# Patient Record
Sex: FEMALE | Race: WHITE | NOT HISPANIC OR LATINO | Employment: OTHER | URBAN - NONMETROPOLITAN AREA
[De-identification: names, ages, dates, MRNs, and addresses within clinical notes are randomized per-mention and may not be internally consistent; named-entity substitution may affect disease eponyms.]

---

## 2017-04-10 ENCOUNTER — OFFICE VISIT (OUTPATIENT)
Dept: OTOLARYNGOLOGY | Facility: CLINIC | Age: 57
End: 2017-04-10

## 2017-04-10 ENCOUNTER — PROCEDURE VISIT (OUTPATIENT)
Dept: OTOLARYNGOLOGY | Facility: CLINIC | Age: 57
End: 2017-04-10

## 2017-04-10 VITALS
DIASTOLIC BLOOD PRESSURE: 88 MMHG | SYSTOLIC BLOOD PRESSURE: 142 MMHG | WEIGHT: 178 LBS | BODY MASS INDEX: 34.95 KG/M2 | HEIGHT: 60 IN | HEART RATE: 75 BPM | TEMPERATURE: 97.8 F

## 2017-04-10 DIAGNOSIS — IMO0001 ASYMMETRICAL HEARING LOSS, LEFT: Primary | ICD-10-CM

## 2017-04-10 DIAGNOSIS — H90.6 MIXED HEARING LOSS, BILATERAL: Primary | ICD-10-CM

## 2017-04-10 PROCEDURE — HEARINGNOCHG: Performed by: AUDIOLOGIST

## 2017-04-10 PROCEDURE — 99213 OFFICE O/P EST LOW 20 MIN: CPT | Performed by: OTOLARYNGOLOGY

## 2017-04-10 NOTE — PROGRESS NOTES
Patient Care Team:  Flor Landa MD as PCP - General  Ajay Ascencio MD as Consulting Physician (Otolaryngology)    Chief complaint: follow-up myringotomy tubes    Subjective     Kaylie Oconnor is a 57 y.o. female who presents status post myringotomy tube insertion. The patient currently has had no related complaints. The patient denies pain, fever, change of hearing and otorrhea.    Review of Systems  HENT: as per HPI  CONSTITUTIONAL: No fever, chills  GI: no nausea or vomiting    History  Past Medical History:   Diagnosis Date   • Allergic rhinitis    • Cellulitis    • Chronic otitis media    • Eustachian tube dysfunction    • Hearing loss    • Hyperlipidemia    • Hypertension    • Tympanic membrane perforation      Past Surgical History:   Procedure Laterality Date   • CLEFT PALATE REPAIR     • MYRINGOTOMY W/ TUBES     • TYMPANOPLASTY       Family History   Problem Relation Age of Onset   • Heart disease Mother    • Hypertension Mother    • Diabetes Mother    • Heart disease Father    • Hypertension Father      Social History   Substance Use Topics   • Smoking status: Never Smoker   • Smokeless tobacco: None   • Alcohol use None       Current Outpatient Prescriptions:   •  atorvastatin (LIPITOR) 10 MG tablet, one (1) time a day, Disp: , Rfl:   •  estradiol (ESTRACE) 1 MG tablet, Take 1 mg by mouth., Disp: , Rfl:   •  meloxicam (MOBIC) 15 MG tablet, one (1) time a day, Disp: , Rfl:   •  metoprolol succinate XL (TOPROL-XL) 25 MG 24 hr tablet, Take 25 mg by mouth., Disp: , Rfl:   •  triamterene-hydrochlorothiazide (DYAZIDE) 37.5-25 MG per capsule, , Disp: , Rfl:   Allergies:  Review of patient's allergies indicates no known allergies.    Objective     Vital Signs  Temp:  [97.8 °F (36.6 °C)] 97.8 °F (36.6 °C)  Heart Rate:  [75] 75  BP: (142)/(88) 142/88    Physical Exam:  CONSTITUTIONAL: well nourished, well-developed, alert, oriented, in no acute distress   COMMUNICATION AND VOICE: able to communicate  normally for age, normal voice quality  HEAD: normocephalic, no lesions, atraumatic, no tenderness, no masses   FACE: appearance normal, no lesions, no tenderness, no deformities, facial motion symmetric  EYES: ocular motility normal, eyelids normal, orbits normal, no proptosis, conjunctiva normal , pupils equal, round   EARS:  Hearing: response to conversational voice normal bilaterally, left hearing aid use  External Ears: auricles without lesions  Otoscopic: ear canals normal, right myringotomy tube with cerumen at the base of the tube  NOSE:  External Nose: structure normal, no tenderness on palpation, no nasal discharge, no lesions, no evidence of trauma, nostrils patent   ORAL:  Lips: upper and lower lips without lesion   NECK: neck appearance normal  CHEST/RESPIRATORY: respiratory effort normal, normal chest excursion  CARDIOVASCULAR: extremities without cyanosis or edema   NEUROLOGIC/PSYCHIATRIC: oriented appropriately, mood normal, affect appropriate, CN II-XII intact grossly    Assessment   1. Mixed hearing loss, bilateral    s/p myringotomy tube insertion  eustachian tube dysfunction    Plan   Dry ear precautions.  Continue hearing aid use  I discussed the patients findings and my recommendations and answered questions.     Follow up in 6 months    Ajay Ascencio MD  04/10/17  10:28 AM

## 2017-10-23 ENCOUNTER — PROCEDURE VISIT (OUTPATIENT)
Dept: OTOLARYNGOLOGY | Facility: CLINIC | Age: 57
End: 2017-10-23

## 2017-10-23 ENCOUNTER — OFFICE VISIT (OUTPATIENT)
Dept: OTOLARYNGOLOGY | Facility: CLINIC | Age: 57
End: 2017-10-23

## 2017-10-23 VITALS
SYSTOLIC BLOOD PRESSURE: 118 MMHG | HEIGHT: 58 IN | WEIGHT: 175.5 LBS | BODY MASS INDEX: 36.84 KG/M2 | DIASTOLIC BLOOD PRESSURE: 76 MMHG | TEMPERATURE: 97.4 F

## 2017-10-23 DIAGNOSIS — E66.9 CLASS 2 OBESITY WITH BODY MASS INDEX (BMI) OF 36.0 TO 36.9 IN ADULT, UNSPECIFIED OBESITY TYPE, UNSPECIFIED WHETHER SERIOUS COMORBIDITY PRESENT: ICD-10-CM

## 2017-10-23 DIAGNOSIS — H90.6 MIXED HEARING LOSS, BILATERAL: Primary | ICD-10-CM

## 2017-10-23 DIAGNOSIS — H69.83 EUSTACHIAN TUBE DYSFUNCTION, BILATERAL: ICD-10-CM

## 2017-10-23 DIAGNOSIS — H69.83 DYSFUNCTION OF BOTH EUSTACHIAN TUBES: Primary | ICD-10-CM

## 2017-10-23 DIAGNOSIS — H90.6 MIXED HEARING LOSS, BILATERAL: ICD-10-CM

## 2017-10-23 PROCEDURE — 99213 OFFICE O/P EST LOW 20 MIN: CPT | Performed by: OTOLARYNGOLOGY

## 2017-10-23 PROCEDURE — HEARINGNOCHG: Performed by: AUDIOLOGIST

## 2017-10-23 RX ORDER — ESTRADIOL 1 MG/1
1 TABLET ORAL
COMMUNITY
Start: 2017-10-11 | End: 2018-10-12

## 2017-10-23 RX ORDER — DIPHENHYDRAMINE HCL 25 MG
25 CAPSULE ORAL NIGHTLY
COMMUNITY
End: 2022-09-19

## 2017-10-23 RX ORDER — CIPROFLOXACIN AND DEXAMETHASONE 3; 1 MG/ML; MG/ML
4 SUSPENSION/ DROPS AURICULAR (OTIC) 2 TIMES DAILY
Qty: 7.5 ML | Refills: 0 | Status: SHIPPED | OUTPATIENT
Start: 2017-10-23 | End: 2017-10-30

## 2017-10-23 NOTE — PROGRESS NOTES
Patient Intake Note    Review of Systems  Review of Systems   Constitutional: Negative for chills, fatigue and fever.   HENT:        See HPI   Respiratory: Negative for cough, choking, shortness of breath and wheezing.    Cardiovascular: Negative.    Gastrointestinal: Negative for constipation, diarrhea, nausea and vomiting.   Allergic/Immunologic: Negative for environmental allergies and food allergies.   Neurological: Negative for dizziness, light-headedness and headaches.   Hematological: Does not bruise/bleed easily.   Psychiatric/Behavioral: Negative for sleep disturbance.         Wilma Stauffer  10/23/2017  9:13 AM

## 2017-10-23 NOTE — PATIENT INSTRUCTIONS
Exercising to Lose Weight  Exercising can help you to lose weight. In order to lose weight through exercise, you need to do vigorous-intensity exercise. You can tell that you are exercising with vigorous intensity if you are breathing very hard and fast and cannot hold a conversation while exercising.  Moderate-intensity exercise helps to maintain your current weight. You can tell that you are exercising at a moderate level if you have a higher heart rate and faster breathing, but you are still able to hold a conversation.  HOW OFTEN SHOULD I EXERCISE?  Choose an activity that you enjoy and set realistic goals. Your health care provider can help you to make an activity plan that works for you. Exercise regularly as directed by your health care provider. This may include:  · Doing resistance training twice each week, such as:    Push-ups.    Sit-ups.    Lifting weights.    Using resistance bands.  · Doing a given intensity of exercise for a given amount of time. Choose from these options:    150 minutes of moderate-intensity exercise every week.    75 minutes of vigorous-intensity exercise every week.    A mix of moderate-intensity and vigorous-intensity exercise every week.  Children, pregnant women, people who are out of shape, people who are overweight, and older adults may need to consult a health care provider for individual recommendations. If you have any sort of medical condition, be sure to consult your health care provider before starting a new exercise program.  WHAT ARE SOME ACTIVITIES THAT CAN HELP ME TO LOSE WEIGHT?   · Walking at a rate of at least 4.5 miles an hour.  · Jogging or running at a rate of 5 miles per hour.  · Biking at a rate of at least 10 miles per hour.  · Lap swimming.  · Roller-skating or in-line skating.  · Cross-country skiing.  · Vigorous competitive sports, such as football, basketball, and soccer.  · Jumping rope.  · Aerobic dancing.  HOW CAN I BE MORE ACTIVE IN MY DAY-TO-DAY  ACTIVITIES?  · Use the stairs instead of the elevator.  · Take a walk during your lunch break.  · If you drive, park your car farther away from work or school.  · If you take public transportation, get off one stop early and walk the rest of the way.  · Make all of your phone calls while standing up and walking around.  · Get up, stretch, and walk around every 30 minutes throughout the day.  WHAT GUIDELINES SHOULD I FOLLOW WHILE EXERCISING?  · Do not exercise so much that you hurt yourself, feel dizzy, or get very short of breath.  · Consult your health care provider prior to starting a new exercise program.  · Wear comfortable clothes and shoes with good support.  · Drink plenty of water while you exercise to prevent dehydration or heat stroke. Body water is lost during exercise and must be replaced.  · Work out until you breathe faster and your heart beats faster.     This information is not intended to replace advice given to you by your health care provider. Make sure you discuss any questions you have with your health care provider.     Document Released: 01/20/2012 Document Revised: 01/08/2016 Document Reviewed: 05/21/2015  [employer] Interactive Patient Education ©2017 [employer]    Calorie Counting for Weight Loss  Calories are energy you get from the things you eat and drink. Your body uses this energy to keep you going throughout the day. The number of calories you eat affects your weight. When you eat more calories than your body needs, your body stores the extra calories as fat. When you eat fewer calories than your body needs, your body burns fat to get the energy it needs.  Calorie counting means keeping track of how many calories you eat and drink each day. If you make sure to eat fewer calories than your body needs, you should lose weight. In order for calorie counting to work, you will need to eat the number of calories that are right for you in a day to lose a healthy amount of weight per week. A  healthy amount of weight to lose per week is usually 1-2 lb (0.5-0.9 kg). A dietitian can determine how many calories you need in a day and give you suggestions on how to reach your calorie goal.   WHAT IS MY MY PLAN?  My goal is to have __________ calories per day.   If I have this many calories per day, I should lose around __________ pounds per week.  WHAT DO I NEED TO KNOW ABOUT CALORIE COUNTING?  In order to meet your daily calorie goal, you will need to:  · Find out how many calories are in each food you would like to eat. Try to do this before you eat.  · Decide how much of the food you can eat.  · Write down what you ate and how many calories it had. Doing this is called keeping a food log.  WHERE DO I FIND CALORIE INFORMATION?  The number of calories in a food can be found on a Nutrition Facts label. Note that all the information on a label is based on a specific serving of the food. If a food does not have a Nutrition Facts label, try to look up the calories online or ask your dietitian for help.  HOW DO I DECIDE HOW MUCH TO EAT?  To decide how much of the food you can eat, you will need to consider both the number of calories in one serving and the size of one serving. This information can be found on the Nutrition Facts label. If a food does not have a Nutrition Facts label, look up the information online or ask your dietitian for help.  Remember that calories are listed per serving. If you choose to have more than one serving of a food, you will have to multiply the calories per serving by the amount of servings you plan to eat. For example, the label on a package of bread might say that a serving size is 1 slice and that there are 90 calories in a serving. If you eat 1 slice, you will have eaten 90 calories. If you eat 2 slices, you will have eaten 180 calories.  HOW DO I KEEP A FOOD LOG?  After each meal, record the following information in your food log:  · What you ate.  · How much of it you  ate.  · How many calories it had.  · Then, add up your calories.  Keep your food log near you, such as in a small notebook in your pocket. Another option is to use a mobile jonah or website. Some programs will calculate calories for you and show you how many calories you have left each time you add an item to the log.  WHAT ARE SOME CALORIE COUNTING TIPS?  · Use your calories on foods and drinks that will fill you up and not leave you hungry. Some examples of this include foods like nuts and nut butters, vegetables, lean proteins, and high-fiber foods (more than 5 g fiber per serving).  · Eat nutritious foods and avoid empty calories. Empty calories are calories you get from foods or beverages that do not have many nutrients, such as candy and soda. It is better to have a nutritious high-calorie food (such as an avocado) than a food with few nutrients (such as a bag of chips).  · Know how many calories are in the foods you eat most often. This way, you do not have to look up how many calories they have each time you eat them.  · Look out for foods that may seem like low-calorie foods but are really high-calorie foods, such as baked goods, soda, and fat-free candy.  · Pay attention to calories in drinks. Drinks such as sodas, specialty coffee drinks, alcohol, and juices have a lot of calories yet do not fill you up. Choose low-calorie drinks like water and diet drinks.  · Focus your calorie counting efforts on higher calorie items. Logging the calories in a garden salad that contains only vegetables is less important than calculating the calories in a milk shake.  · Find a way of tracking calories that works for you. Get creative. Most people who are successful find ways to keep track of how much they eat in a day, even if they do not count every calorie.  WHAT ARE SOME PORTION CONTROL TIPS?  · Know how many calories are in a serving. This will help you know how many servings of a certain food you can have.  · Use a  measuring cup to measure serving sizes. This is helpful when you start out. With time, you will be able to estimate serving sizes for some foods.  · Take some time to put servings of different foods on your favorite plates, bowls, and cups so you know what a serving looks like.  · Try not to eat straight from a bag or box. Doing this can lead to overeating. Put the amount you would like to eat in a cup or on a plate to make sure you are eating the right portion.  · Use smaller plates, glasses, and bowls to prevent overeating. This is a quick and easy way to practice portion control. If your plate is smaller, less food can fit on it.  · Try not to multitask while eating, such as watching TV or using your computer. If it is time to eat, sit down at a table and enjoy your food. Doing this will help you to start recognizing when you are full. It will also make you more aware of what and how much you are eating.  HOW CAN I CALORIE COUNT WHEN EATING OUT?  · Ask for smaller portion sizes or child-sized portions.  · Consider sharing an entree and sides instead of getting your own entree.  · If you get your own entree, eat only half. Ask for a box at the beginning of your meal and put the rest of your entree in it so you are not tempted to eat it.  · Look for the calories on the menu. If calories are listed, choose the lower calorie options.  · Choose dishes that include vegetables, fruits, whole grains, low-fat dairy products, and lean protein. Focusing on smart food choices from each of the 5 food groups can help you stay on track at restaurants.  · Choose items that are boiled, broiled, grilled, or steamed.  · Choose water, milk, unsweetened iced tea, or other drinks without added sugars. If you want an alcoholic beverage, choose a lower calorie option. For example, a regular alexandr can have up to 700 calories and a glass of wine has around 150.  · Stay away from items that are buttered, battered, fried, or served with  "cream sauce. Items labeled \"crispy\" are usually fried, unless stated otherwise.  · Ask for dressings, sauces, and syrups on the side. These are usually very high in calories, so do not eat much of them.  · Watch out for salads. Many people think salads are a healthy option, but this is often not the case. Many salads come with wright, fried chicken, lots of cheese, fried chips, and dressing. All of these items have a lot of calories. If you want a salad, choose a garden salad and ask for grilled meats or steak. Ask for the dressing on the side, or ask for olive oil and vinegar or lemon to use as dressing.  · Estimate how many servings of a food you are given. For example, a serving of cooked rice is ½ cup or about the size of half a tennis ball or one cupcake wrapper. Knowing serving sizes will help you be aware of how much food you are eating at restaurants. The list below tells you how big or small some common portion sizes are based on everyday objects.    1 oz--4 stacked dice.    3 oz--1 deck of cards.    1 tsp--1 dice.    1 Tbsp--½ a Ping-Pong ball.    2 Tbsp--1 Ping-Pong ball.    ½ cup--1 tennis ball or 1 cupcake wrapper.    1 cup--1 baseball.     This information is not intended to replace advice given to you by your health care provider. Make sure you discuss any questions you have with your health care provider.     Document Released: 12/18/2006 Document Revised: 01/08/2016 Document Reviewed: 10/23/2014  [employer] Interactive Patient Education ©2017 [employer]    "

## 2017-10-23 NOTE — PROGRESS NOTES
Patient Care Team:  Flor Landa MD as PCP - General  Ajay Ascencio MD as Consulting Physician (Otolaryngology)    Chief complaint: follow-up myringotomy tubes    Subjective   HPI  Kaylie Oconnor is a 57 y.o. female who presents status post myringotomy tube insertion. The patient currently has had no related complaints. The patient denies pain, fever,and otorrhea.  Parents have noticed that she is not hearing as well lately.  She wears a left-sided hearing aid that is over 10 years old.    Review of Systems  HENT: as per HPI  CONSTITUTIONAL: No fever, chills  GI: no nausea or vomiting    History  Past Medical History:   Diagnosis Date   • Allergic rhinitis    • Cellulitis    • Chronic otitis media    • Eustachian tube dysfunction    • Hearing loss    • Hyperlipidemia    • Hypertension    • Tympanic membrane perforation      Past Surgical History:   Procedure Laterality Date   • CLEFT PALATE REPAIR     • MYRINGOTOMY W/ TUBES     • TYMPANOPLASTY       Family History   Problem Relation Age of Onset   • Heart disease Mother    • Hypertension Mother    • Diabetes Mother    • Heart disease Father    • Hypertension Father      Social History   Substance Use Topics   • Smoking status: Never Smoker   • Smokeless tobacco: Never Used   • Alcohol use No       Current Outpatient Prescriptions:   •  atorvastatin (LIPITOR) 10 MG tablet, one (1) time a day, Disp: , Rfl:   •  diphenhydrAMINE (BENADRYL) 25 mg capsule, Take 25 mg by mouth Every Night., Disp: , Rfl:   •  estradiol (ESTRACE) 1 MG tablet, Take 1 mg by mouth., Disp: , Rfl:   •  meloxicam (MOBIC) 15 MG tablet, one (1) time a day, Disp: , Rfl:   •  metoprolol succinate XL (TOPROL-XL) 25 MG 24 hr tablet, Take 25 mg by mouth., Disp: , Rfl:   •  triamterene-hydrochlorothiazide (DYAZIDE) 37.5-25 MG per capsule, , Disp: , Rfl:   •  ciprofloxacin-dexamethasone (CIPRODEX) 0.3-0.1 % otic suspension, Administer 4 drops into ears 2 (Two) Times a Day for 7 days., Disp: 7.5  mL, Rfl: 0  Allergies:  Review of patient's allergies indicates no known allergies.    Objective   Vital Signs  Temp:  [97.4 °F (36.3 °C)] 97.4 °F (36.3 °C)  BP: (118)/(76) 118/76    HPI  CONSTITUTIONAL: well nourished, well-developed, alert, oriented, in no acute distress   COMMUNICATION AND VOICE: able to communicate normally for age, normal voice quality  HEAD: normocephalic, no lesions, atraumatic, no tenderness, no masses   FACE: appearance normal, no lesions, no tenderness, no deformities, facial motion symmetric  EYES: ocular motility normal, eyelids normal, orbits normal, no proptosis, conjunctiva normal , pupils equal, round   EARS:  Hearing: response to conversational voice normal bilaterally   External Ears: auricles without lesions  Otoscopic:   RIGHT EXTERNAL EAR CANAL: normal ear canal structure, no stenosis present, mild cerumen present,  LEFT EXTERNAL EAR CANAL: normal structure, no stenosis, no lesions, no drainage present,  RIGHT TYMPANIC MEMBRANE: right myringotomy tube in place, dry, obstructed with cerumen,  LEFT TYMPANIC MEMBRANE: no lesions present, no perforation present cartilaginous grafting present  NOSE:  External Nose: structure normal, no tenderness on palpation, no nasal discharge, no lesions, no evidence of trauma, nostrils patent   ORAL:  Lips: upper and lower lips without lesion   NECK: neck appearance normal  CHEST/RESPIRATORY: respiratory effort normal, normal chest excursion  CARDIOVASCULAR: extremities without cyanosis or edema   NEUROLOGIC/PSYCHIATRIC: oriented appropriately, mood normal, affect appropriate, CN II-XII intact grossly    Assessment   1. Mixed hearing loss, bilateral    2. Eustachian tube dysfunction, bilateral    3. Class 2 obesity with body mass index (BMI) of 36.0 to 36.9 in adult, unspecified obesity type, unspecified whether serious comorbidity present          Plan    I am wondering if she has obstructed her right tube.  We will place her on Ciprodex to try  to liquefy the crusting.  Otherwise, she will need to discuss updating her hearing aids with audiology.  Dry ear precautions.  Call for problems, especially ear pain or pressure, ear drainage, fever, or decreased hearing.  I discussed the patients findings and my recommendations and answered questions.     Return in about 2 months (around 12/23/2017).    Ajay Ascencio MD  10/23/17  9:33 AM

## 2017-10-23 NOTE — PROGRESS NOTES
Ms. Oconnor presented to the clinic this date for a hearing aid recheck. She was accompanied to this visit by her mother who reported Kaylie will often not respond when spoken to. She was unsure if this was an issue with the hearing aid or lack of attention.    Aid was cleaned and wax guard was changed. A sensogram was performed and indicated essentially stable hearing.     Otoscopy revealed an in place PE tube with cerumen in the right ear and a clear EAC left. Tympanometry suggested a blocked right tube. Kaylie was seen by Dr. Ascencio prior to aid check and was prescribed ear drops. We will do a formal audiogram on next visit once right tube is clear.

## 2018-01-08 ENCOUNTER — OFFICE VISIT (OUTPATIENT)
Dept: OTOLARYNGOLOGY | Facility: CLINIC | Age: 58
End: 2018-01-08

## 2018-01-08 VITALS
TEMPERATURE: 97.6 F | HEIGHT: 58 IN | WEIGHT: 165.5 LBS | BODY MASS INDEX: 34.74 KG/M2 | DIASTOLIC BLOOD PRESSURE: 78 MMHG | SYSTOLIC BLOOD PRESSURE: 112 MMHG

## 2018-01-08 DIAGNOSIS — H69.83 EUSTACHIAN TUBE DYSFUNCTION, BILATERAL: Primary | ICD-10-CM

## 2018-01-08 DIAGNOSIS — H90.6 MIXED HEARING LOSS, BILATERAL: ICD-10-CM

## 2018-01-08 PROCEDURE — 99213 OFFICE O/P EST LOW 20 MIN: CPT | Performed by: OTOLARYNGOLOGY

## 2018-01-08 NOTE — PATIENT INSTRUCTIONS
MyPlate from [employer]  The general, healthful diet is based on the 2010 Dietary Guidelines for Americans. The amount of food you need to eat from each food group depends on your age, sex, and level of physical activity and can be individualized by a dietitian. Go to ChooseMyPlate.gov for more information.  WHAT DO I NEED TO KNOW ABOUT THE MYPLATE PLAN?  · Enjoy your food, but eat less.    · Avoid oversized portions.      ½ of your plate should include fruits and vegetables.    ¼ of your plate should be grains.    ¼ of your plate should be protein.  Grains  · Make at least half of your grains whole grains.  · For a 2,000 calorie daily food plan, eat 6 oz every day.  · 1 oz is about 1 slice bread, 1 cup cereal, or ½ cup cooked rice, cereal, or pasta.  Vegetables  · Make half your plate fruits and vegetables.  · For a 2,000 calorie daily food plan, eat 2½ cups every day.  · 1 cup is about 1 cup raw or cooked vegetables or vegetable juice or 2 cups raw leafy greens.  Fruits  · Make half your plate fruits and vegetables.  · For a 2,000 calorie daily food plan, eat 2 cups every day.  · 1 cup is about 1 cup fruit or 100% fruit juice or ½ cup dried fruit.  Protein  · For a 2,000 calorie daily food plan, eat 5½ oz every day.  · 1 oz is about 1 oz meat, poultry, or fish, ¼ cup cooked beans, 1 egg, 1 Tbsp peanut butter, or ½ oz nuts or seeds.  Dairy  · Switch to fat-free or low-fat (1%) milk.  · For a 2,000 calorie daily food plan, eat 3 cups every day.  · 1 cup is about 1 cup milk or yogurt or soy milk (soy beverage), 1½ oz natural cheese, or 2 oz processed cheese.  Fats, Oils, and Empty Calories  · Only small amounts of oils are recommended.  · Empty calories are calories from solid fats or added sugars.  · Compare sodium in foods like soup, bread, and frozen meals. Choose the foods with lower numbers.  · Drink water instead of sugary drinks.  WHAT FOODS CAN I EAT?  Grains  Whole grains such as whole wheat, quinoa, millet, and  bulgur. Bread, rolls, and pasta made from whole grains. Brown or wild rice. Hot or cold cereals made from whole grains and without added sugar.  Vegetables  All fresh vegetables, especially fresh red, dark green, or orange vegetables. Peas and beans. Low-sodium frozen or canned vegetables prepared without added salt. Low-sodium vegetable juices.  Fruits  All fresh, frozen, and dried fruits. Canned fruit packed in water or fruit juice without added sugar. Fruit juices without added sugar.  Meats and Other Protein Sources  Boiled, baked, or grilled lean meat trimmed of fat. Skinless poultry. Fresh seafood and shellfish. Canned seafood packed in water. Unsalted nuts and unsalted nut butters. Tofu. Dried beans and pea. Eggs.  Dairy  Low-fat or fat-free milk, yogurt, and cheeses.   Sweets and Desserts  Frozen desserts made from low-fat milk.  Fats and Oils  Olive, peanut, and canola oils and margarine. Salad dressing and mayonnaise made from these oils.  Other  Soups and casseroles made from allowed ingredients and without added fat or salt.  The items listed above may not be a complete list of recommended foods or beverages. Contact your dietitian for more options.   WHAT FOODS ARE NOT RECOMMENDED?  Grains  Sweetened, low-fiber cereals. Packaged baked goods. Snack crackers and chips. Cheese crackers, butter crackers, and biscuits. Frozen waffles, sweet breads, doughnuts, pastries, packaged baking mixes, pancakes, cakes, and cookies.  Vegetables  Regular canned or frozen vegetables or vegetables prepared with salt. Canned tomatoes. Canned tomato sauce. Fried vegetables. Vegetables in cream sauce or cheese sauce.  Fruits  Fruits packed in syrup or made with added sugar.   Meats and Other Protein Sources  Marbled or fatty meats such as ribs. Poultry with skin. Fried meats, poultry, eggs, or fish. Sausages, hot dogs, and deli meats such as pastrami, bologna, or salami.  Dairy  Whole milk, cream, cheeses made from whole  milk, sour cream. Ice cream or yogurt made from whole milk or with added sugar.  Beverages  For adults, no more than one alcoholic drink per day. Regular soft drinks or other sugary beverages. Juice drinks.  Sweets and Desserts  Sugary or fatty desserts, candy, and other sweets.  Fats and Oils  Solid shortening or partially hydrogenated oils. Solid margarine. Margarine that contains trans fats. Butter.  The items listed above may not be a complete list of foods and beverages to avoid. Contact your dietitian for more information.     This information is not intended to replace advice given to you by your health care provider. Make sure you discuss any questions you have with your health care provider.     Document Released: 01/06/2009 Document Revised: 01/08/2016 Document Reviewed: 11/26/2014  [employer] Interactive Patient Education ©2017 [employer]   Calorie Counting for Weight Loss  Calories are energy you get from the things you eat and drink. Your body uses this energy to keep you going throughout the day. The number of calories you eat affects your weight. When you eat more calories than your body needs, your body stores the extra calories as fat. When you eat fewer calories than your body needs, your body burns fat to get the energy it needs.  Calorie counting means keeping track of how many calories you eat and drink each day. If you make sure to eat fewer calories than your body needs, you should lose weight. In order for calorie counting to work, you will need to eat the number of calories that are right for you in a day to lose a healthy amount of weight per week. A healthy amount of weight to lose per week is usually 1-2 lb (0.5-0.9 kg). A dietitian can determine how many calories you need in a day and give you suggestions on how to reach your calorie goal.   WHAT IS MY MY PLAN?  My goal is to have __________ calories per day.   If I have this many calories per day, I should lose around __________ pounds  per week.  WHAT DO I NEED TO KNOW ABOUT CALORIE COUNTING?  In order to meet your daily calorie goal, you will need to:  · Find out how many calories are in each food you would like to eat. Try to do this before you eat.  · Decide how much of the food you can eat.  · Write down what you ate and how many calories it had. Doing this is called keeping a food log.  WHERE DO I FIND CALORIE INFORMATION?  The number of calories in a food can be found on a Nutrition Facts label. Note that all the information on a label is based on a specific serving of the food. If a food does not have a Nutrition Facts label, try to look up the calories online or ask your dietitian for help.  HOW DO I DECIDE HOW MUCH TO EAT?  To decide how much of the food you can eat, you will need to consider both the number of calories in one serving and the size of one serving. This information can be found on the Nutrition Facts label. If a food does not have a Nutrition Facts label, look up the information online or ask your dietitian for help.  Remember that calories are listed per serving. If you choose to have more than one serving of a food, you will have to multiply the calories per serving by the amount of servings you plan to eat. For example, the label on a package of bread might say that a serving size is 1 slice and that there are 90 calories in a serving. If you eat 1 slice, you will have eaten 90 calories. If you eat 2 slices, you will have eaten 180 calories.  HOW DO I KEEP A FOOD LOG?  After each meal, record the following information in your food log:  · What you ate.  · How much of it you ate.  · How many calories it had.  · Then, add up your calories.  Keep your food log near you, such as in a small notebook in your pocket. Another option is to use a mobile jonah or website. Some programs will calculate calories for you and show you how many calories you have left each time you add an item to the log.  WHAT ARE SOME CALORIE COUNTING  TIPS?  · Use your calories on foods and drinks that will fill you up and not leave you hungry. Some examples of this include foods like nuts and nut butters, vegetables, lean proteins, and high-fiber foods (more than 5 g fiber per serving).  · Eat nutritious foods and avoid empty calories. Empty calories are calories you get from foods or beverages that do not have many nutrients, such as candy and soda. It is better to have a nutritious high-calorie food (such as an avocado) than a food with few nutrients (such as a bag of chips).  · Know how many calories are in the foods you eat most often. This way, you do not have to look up how many calories they have each time you eat them.  · Look out for foods that may seem like low-calorie foods but are really high-calorie foods, such as baked goods, soda, and fat-free candy.  · Pay attention to calories in drinks. Drinks such as sodas, specialty coffee drinks, alcohol, and juices have a lot of calories yet do not fill you up. Choose low-calorie drinks like water and diet drinks.  · Focus your calorie counting efforts on higher calorie items. Logging the calories in a garden salad that contains only vegetables is less important than calculating the calories in a milk shake.  · Find a way of tracking calories that works for you. Get creative. Most people who are successful find ways to keep track of how much they eat in a day, even if they do not count every calorie.  WHAT ARE SOME PORTION CONTROL TIPS?  · Know how many calories are in a serving. This will help you know how many servings of a certain food you can have.  · Use a measuring cup to measure serving sizes. This is helpful when you start out. With time, you will be able to estimate serving sizes for some foods.  · Take some time to put servings of different foods on your favorite plates, bowls, and cups so you know what a serving looks like.  · Try not to eat straight from a bag or box. Doing this can lead to  "overeating. Put the amount you would like to eat in a cup or on a plate to make sure you are eating the right portion.  · Use smaller plates, glasses, and bowls to prevent overeating. This is a quick and easy way to practice portion control. If your plate is smaller, less food can fit on it.  · Try not to multitask while eating, such as watching TV or using your computer. If it is time to eat, sit down at a table and enjoy your food. Doing this will help you to start recognizing when you are full. It will also make you more aware of what and how much you are eating.  HOW CAN I CALORIE COUNT WHEN EATING OUT?  · Ask for smaller portion sizes or child-sized portions.  · Consider sharing an entree and sides instead of getting your own entree.  · If you get your own entree, eat only half. Ask for a box at the beginning of your meal and put the rest of your entree in it so you are not tempted to eat it.  · Look for the calories on the menu. If calories are listed, choose the lower calorie options.  · Choose dishes that include vegetables, fruits, whole grains, low-fat dairy products, and lean protein. Focusing on smart food choices from each of the 5 food groups can help you stay on track at restaurants.  · Choose items that are boiled, broiled, grilled, or steamed.  · Choose water, milk, unsweetened iced tea, or other drinks without added sugars. If you want an alcoholic beverage, choose a lower calorie option. For example, a regular alexandr can have up to 700 calories and a glass of wine has around 150.  · Stay away from items that are buttered, battered, fried, or served with cream sauce. Items labeled \"crispy\" are usually fried, unless stated otherwise.  · Ask for dressings, sauces, and syrups on the side. These are usually very high in calories, so do not eat much of them.  · Watch out for salads. Many people think salads are a healthy option, but this is often not the case. Many salads come with wright, fried " chicken, lots of cheese, fried chips, and dressing. All of these items have a lot of calories. If you want a salad, choose a garden salad and ask for grilled meats or steak. Ask for the dressing on the side, or ask for olive oil and vinegar or lemon to use as dressing.  · Estimate how many servings of a food you are given. For example, a serving of cooked rice is ½ cup or about the size of half a tennis ball or one cupcake wrapper. Knowing serving sizes will help you be aware of how much food you are eating at restaurants. The list below tells you how big or small some common portion sizes are based on everyday objects.    1 oz--4 stacked dice.    3 oz--1 deck of cards.    1 tsp--1 dice.    1 Tbsp--½ a Ping-Pong ball.    2 Tbsp--1 Ping-Pong ball.    ½ cup--1 tennis ball or 1 cupcake wrapper.    1 cup--1 baseball.     This information is not intended to replace advice given to you by your health care provider. Make sure you discuss any questions you have with your health care provider.     Document Released: 12/18/2006 Document Revised: 01/08/2016 Document Reviewed: 10/23/2014  [employer] Interactive Patient Education ©2017 [employer]     Exercising to Lose Weight  Exercising can help you to lose weight. In order to lose weight through exercise, you need to do vigorous-intensity exercise. You can tell that you are exercising with vigorous intensity if you are breathing very hard and fast and cannot hold a conversation while exercising.  Moderate-intensity exercise helps to maintain your current weight. You can tell that you are exercising at a moderate level if you have a higher heart rate and faster breathing, but you are still able to hold a conversation.  HOW OFTEN SHOULD I EXERCISE?  Choose an activity that you enjoy and set realistic goals. Your health care provider can help you to make an activity plan that works for you. Exercise regularly as directed by your health care provider. This may include:  · Doing  resistance training twice each week, such as:    Push-ups.    Sit-ups.    Lifting weights.    Using resistance bands.  · Doing a given intensity of exercise for a given amount of time. Choose from these options:    150 minutes of moderate-intensity exercise every week.    75 minutes of vigorous-intensity exercise every week.    A mix of moderate-intensity and vigorous-intensity exercise every week.  Children, pregnant women, people who are out of shape, people who are overweight, and older adults may need to consult a health care provider for individual recommendations. If you have any sort of medical condition, be sure to consult your health care provider before starting a new exercise program.  WHAT ARE SOME ACTIVITIES THAT CAN HELP ME TO LOSE WEIGHT?   · Walking at a rate of at least 4.5 miles an hour.  · Jogging or running at a rate of 5 miles per hour.  · Biking at a rate of at least 10 miles per hour.  · Lap swimming.  · Roller-skating or in-line skating.  · Cross-country skiing.  · Vigorous competitive sports, such as football, basketball, and soccer.  · Jumping rope.  · Aerobic dancing.  HOW CAN I BE MORE ACTIVE IN MY DAY-TO-DAY ACTIVITIES?  · Use the stairs instead of the elevator.  · Take a walk during your lunch break.  · If you drive, park your car farther away from work or school.  · If you take public transportation, get off one stop early and walk the rest of the way.  · Make all of your phone calls while standing up and walking around.  · Get up, stretch, and walk around every 30 minutes throughout the day.  WHAT GUIDELINES SHOULD I FOLLOW WHILE EXERCISING?  · Do not exercise so much that you hurt yourself, feel dizzy, or get very short of breath.  · Consult your health care provider prior to starting a new exercise program.  · Wear comfortable clothes and shoes with good support.  · Drink plenty of water while you exercise to prevent dehydration or heat stroke. Body water is lost during exercise and  must be replaced.  · Work out until you breathe faster and your heart beats faster.     This information is not intended to replace advice given to you by your health care provider. Make sure you discuss any questions you have with your health care provider.     Document Released: 01/20/2012 Document Revised: 01/08/2016 Document Reviewed: 05/21/2015  [employer] Interactive Patient Education ©2017 [employer]

## 2018-01-08 NOTE — PROGRESS NOTES
Patient Care Team:  Flor Landa MD as PCP - General  Ajay Ascencio MD as Consulting Physician (Otolaryngology)    Chief complaint: follow-up myringotomy tubes    Subjective   HPI  Kaylie Oconnor is a 57 y.o. female who presents status post myringotomy tube insertion. The patient currently has had no related complaints. The patient denies pain, fever, change of hearing and otorrhea.    Review of Systems  HENT: as per HPI  CONSTITUTIONAL: No fever, chills  GI: no nausea or vomiting    History  Past Medical History:   Diagnosis Date   • Allergic rhinitis    • Cellulitis    • Chronic otitis media    • Eustachian tube dysfunction    • Hearing loss    • Hyperlipidemia    • Hypertension    • Tympanic membrane perforation      Past Surgical History:   Procedure Laterality Date   • CLEFT PALATE REPAIR     • MYRINGOTOMY W/ TUBES     • TYMPANOPLASTY       Family History   Problem Relation Age of Onset   • Heart disease Mother    • Hypertension Mother    • Diabetes Mother    • Heart disease Father    • Hypertension Father      Social History   Substance Use Topics   • Smoking status: Never Smoker   • Smokeless tobacco: Never Used   • Alcohol use No       Current Outpatient Prescriptions:   •  atorvastatin (LIPITOR) 10 MG tablet, one (1) time a day, Disp: , Rfl:   •  diphenhydrAMINE (BENADRYL) 25 mg capsule, Take 25 mg by mouth Every Night., Disp: , Rfl:   •  estradiol (ESTRACE) 1 MG tablet, Take 1 mg by mouth., Disp: , Rfl:   •  meloxicam (MOBIC) 15 MG tablet, one (1) time a day, Disp: , Rfl:   •  metoprolol succinate XL (TOPROL-XL) 25 MG 24 hr tablet, Take 25 mg by mouth., Disp: , Rfl:   •  triamterene-hydrochlorothiazide (DYAZIDE) 37.5-25 MG per capsule, , Disp: , Rfl:   Allergies:  Review of patient's allergies indicates no known allergies.    Objective   Vital Signs  Temp:  [97.6 °F (36.4 °C)] 97.6 °F (36.4 °C)  BP: (112)/(78) 112/78    HPI  CONSTITUTIONAL: well nourished, well-developed, alert, oriented, in no  acute distress   COMMUNICATION AND VOICE: able to communicate normally for age, normal voice quality  HEAD: normocephalic, no lesions, atraumatic, no tenderness, no masses   FACE: appearance normal, no lesions, no tenderness, no deformities, facial motion symmetric  EYES: ocular motility normal, eyelids normal, orbits normal, no proptosis, conjunctiva normal , pupils equal, round   EARS:  Hearing: response to conversational voice normal bilaterally   External Ears: auricles without lesions  Otoscopic:   EXTERNAL EAR CANALS: normal ear canals without stenosis or significant cerumen  RIGHT TYMPANIC MEMBRANE: right myringotomy tube in place, obstructed with cerumen,  LEFT TYMPANIC MEMBRANE: Left tympanic membrane with postoperative and cartilaginous change  NOSE:  External Nose: structure normal, no tenderness on palpation, no nasal discharge, no lesions, no evidence of trauma, nostrils patent   ORAL:  Lips: upper and lower lips without lesion   NECK: neck appearance normal  CHEST/RESPIRATORY: respiratory effort normal, normal chest excursion  CARDIOVASCULAR: extremities without cyanosis or edema   NEUROLOGIC/PSYCHIATRIC: oriented appropriately, mood normal, affect appropriate, CN II-XII intact grossly    Assessment   1. Eustachian tube dysfunction, bilateral    2. Mixed hearing loss, bilateral          Plan   Dry ear precautions.  Call for problems, especially ear pain or pressure, ear drainage, fever, or decreased hearing.  I discussed the patients findings and my recommendations and answered questions.   We will be conservative with a plugged tube at this time.  If she develops ear pain, drainage, or hearing loss, we may need to consider removal of the obstructed tube with replacement of a fresh T-tube.    Return in about 4 months (around 5/8/2018).    Ajay Ascencio MD  01/08/18  3:05 PM

## 2018-01-08 NOTE — PROGRESS NOTES
Patient Intake Note    Review of Systems  Review of Systems   Constitutional: Negative for chills, fatigue and fever.   HENT:        See HPI   Respiratory: Negative for cough, choking, shortness of breath and wheezing.    Cardiovascular: Negative.    Gastrointestinal: Negative for constipation, diarrhea, nausea and vomiting.   Allergic/Immunologic: Negative for environmental allergies and food allergies.   Neurological: Negative for dizziness, light-headedness and headaches.   Hematological: Does not bruise/bleed easily.   Psychiatric/Behavioral: Negative for sleep disturbance.         Gretchen Cheema RN  1/8/2018  2:39 PM

## 2018-04-26 ENCOUNTER — OFFICE VISIT (OUTPATIENT)
Dept: OTOLARYNGOLOGY | Facility: CLINIC | Age: 58
End: 2018-04-26

## 2018-04-26 VITALS
WEIGHT: 165.5 LBS | TEMPERATURE: 97.2 F | SYSTOLIC BLOOD PRESSURE: 128 MMHG | DIASTOLIC BLOOD PRESSURE: 82 MMHG | BODY MASS INDEX: 34.74 KG/M2 | HEIGHT: 58 IN

## 2018-04-26 DIAGNOSIS — H69.83 EUSTACHIAN TUBE DYSFUNCTION, BILATERAL: Primary | ICD-10-CM

## 2018-04-26 DIAGNOSIS — H90.6 MIXED HEARING LOSS, BILATERAL: ICD-10-CM

## 2018-04-26 PROCEDURE — 99213 OFFICE O/P EST LOW 20 MIN: CPT | Performed by: OTOLARYNGOLOGY

## 2018-04-26 NOTE — PROGRESS NOTES
Ajay Ascencio MD     Chief Complaint   Patient presents with   • Follow-up     recheck tube       HPI   Kaylie Oconnor is a  58 y.o. female who is here for follow up. She has had no current complaints. The patient has not had complaints of: recent flair ups of symptoms.    Review of Systems:  Reviewed per patient intake note    Past History:  Past medical and surgical history, family history and social history reviewed and updated when appropriate.  Current medications and allergies reviewed and updated when appropriate.  Allergies:  Review of patient's allergies indicates no known allergies.    Vital Signs:   Temp:  [97.2 °F (36.2 °C)] 97.2 °F (36.2 °C)  BP: (128)/(82) 128/82    Physical Exam   CONSTITUTIONAL: well nourished, well-developed, alert, oriented, in no acute distress   COMMUNICATION AND VOICE: able to communicate normally, normal voice quality  HEAD: normocephalic, no lesions, atraumatic, no tenderness, no masses   FACE: appearance normal, no lesions, no tenderness, no deformities, facial motion symmetric  EYES: ocular motility normal, eyelids normal, orbits normal, no proptosis, conjunctiva normal , pupils equal, round  HEARING: normal with hearing aid use  EXTERNAL EARS: auricles without lesions  EXTERNAL EAR CANALS: normal ear canals without stenosis or significant cerumen  RIGHT TYMPANIC MEMBRANE: right myringotomy tube: in place, with cerumen at the base of the tube,  LEFT TYMPANIC MEMBRANE: moderate  post surgical change present,, cartilaginous grafting present,  EXTERNAL NOSE: structure normal, no tenderness on palpation, no nasal discharge, no lesions, no evidence of trauma, nostrils patent  LIPS: structure normal, no tenderness on palpation, no lesions, no evidence of trauma  NECK: neck appearance normal  LYMPH NODES: no lymphadenopathy  CHEST/RESPIRATORY: respiratory effort normal  CARDIOVASCULAR: extremities without cyanosis or edema, no overt jugulovenous distension  "present  NEUROLOGIC/PSYCHIATRIC: oriented appropriately for age, mood normal, affect appropriate, cranial nerves intact grossly unless specifically mentioned above     {RESULTS REVIEW (Optional):21963::\" \":1}    Assessment   1. Eustachian tube dysfunction, bilateral    2. Mixed hearing loss, bilateral        Plan    Continue current management plan.  Call for ear problems, especially otalgia, otalgia, otorrhea and change in hearing.    Return in about 6 months (around 10/26/2018).    Ajay Ascencio MD  04/26/18  11:02 AM    "

## 2018-04-26 NOTE — PROGRESS NOTES
Wilma Stauffer   Patient Intake Note    Review of Systems  Review of Systems   Constitutional: Negative for chills, fatigue and fever.   HENT:        See HPI   Respiratory: Negative for cough, choking, shortness of breath and wheezing.    Cardiovascular: Negative.    Gastrointestinal: Negative for constipation, diarrhea, nausea and vomiting.   Allergic/Immunologic: Negative for environmental allergies and food allergies.   Neurological: Negative for dizziness, light-headedness and headaches.   Hematological: Does not bruise/bleed easily.   Psychiatric/Behavioral: Negative for sleep disturbance.       QUALITY MEASURES    Body Mass Index Screening and Follow-Up Plan  Body mass index is 34.59 kg/m².  Patient's Body mass index is 34.59 kg/m². BMI is above normal parameters. Follow-up plan includes:  exercise counseling.    Tobacco Use: Screening and Cessation Intervention  Smoking status: Never Smoker                                                              Smokeless tobacco: Never Used                            Wilma Stauffer  4/26/2018  10:35 AM

## 2018-10-25 ENCOUNTER — OFFICE VISIT (OUTPATIENT)
Dept: OTOLARYNGOLOGY | Facility: CLINIC | Age: 58
End: 2018-10-25

## 2018-10-25 VITALS
HEIGHT: 58 IN | BODY MASS INDEX: 34.22 KG/M2 | DIASTOLIC BLOOD PRESSURE: 84 MMHG | SYSTOLIC BLOOD PRESSURE: 134 MMHG | TEMPERATURE: 97.9 F | WEIGHT: 163 LBS

## 2018-10-25 DIAGNOSIS — H90.6 MIXED HEARING LOSS, BILATERAL: ICD-10-CM

## 2018-10-25 DIAGNOSIS — T85.898A VENTILATION TUBE BLOCKED, INITIAL ENCOUNTER: ICD-10-CM

## 2018-10-25 DIAGNOSIS — H69.83 EUSTACHIAN TUBE DYSFUNCTION, BILATERAL: Primary | ICD-10-CM

## 2018-10-25 PROCEDURE — 99213 OFFICE O/P EST LOW 20 MIN: CPT | Performed by: OTOLARYNGOLOGY

## 2018-10-25 RX ORDER — ESTRADIOL 1 MG/1
1 TABLET ORAL DAILY
COMMUNITY
Start: 2018-10-19 | End: 2019-10-28 | Stop reason: ALTCHOICE

## 2018-10-25 NOTE — PATIENT INSTRUCTIONS
MyPlate from [employer]  The general, healthful diet is based on the 2010 Dietary Guidelines for Americans. The amount of food you need to eat from each food group depends on your age, sex, and level of physical activity and can be individualized by a dietitian. Go to ChooseMyPlate.gov for more information.  What do I need to know about the MyPlate plan?  · Enjoy your food, but eat less.  · Avoid oversized portions.  ? ½ of your plate should include fruits and vegetables.  ? ¼ of your plate should be grains.  ? ¼ of your plate should be protein.  Grains  · Make at least half of your grains whole grains.  · For a 2,000 calorie daily food plan, eat 6 oz every day.  · 1 oz is about 1 slice bread, 1 cup cereal, or ½ cup cooked rice, cereal, or pasta.  Vegetables  · Make half your plate fruits and vegetables.  · For a 2,000 calorie daily food plan, eat 2½ cups every day.  · 1 cup is about 1 cup raw or cooked vegetables or vegetable juice or 2 cups raw leafy greens.  Fruits  · Make half your plate fruits and vegetables.  · For a 2,000 calorie daily food plan, eat 2 cups every day.  · 1 cup is about 1 cup fruit or 100% fruit juice or ½ cup dried fruit.  Protein  · For a 2,000 calorie daily food plan, eat 5½ oz every day.  · 1 oz is about 1 oz meat, poultry, or fish, ¼ cup cooked beans, 1 egg, 1 Tbsp peanut butter, or ½ oz nuts or seeds.  Dairy  · Switch to fat-free or low-fat (1%) milk.  · For a 2,000 calorie daily food plan, eat 3 cups every day.  · 1 cup is about 1 cup milk or yogurt or soy milk (soy beverage), 1½ oz natural cheese, or 2 oz processed cheese.  Fats, Oils, and Empty Calories  · Only small amounts of oils are recommended.  · Empty calories are calories from solid fats or added sugars.  · Compare sodium in foods like soup, bread, and frozen meals. Choose the foods with lower numbers.  · Drink water instead of sugary drinks.  What foods can I eat?  Grains  Whole grains such as whole wheat, quinoa, millet, and  bulgur. Bread, rolls, and pasta made from whole grains. Brown or wild rice. Hot or cold cereals made from whole grains and without added sugar.  Vegetables  All fresh vegetables, especially fresh red, dark green, or orange vegetables. Peas and beans. Low-sodium frozen or canned vegetables prepared without added salt. Low-sodium vegetable juices.  Fruits  All fresh, frozen, and dried fruits. Canned fruit packed in water or fruit juice without added sugar. Fruit juices without added sugar.  Meats and Other Protein Sources  Boiled, baked, or grilled lean meat trimmed of fat. Skinless poultry. Fresh seafood and shellfish. Canned seafood packed in water. Unsalted nuts and unsalted nut butters. Tofu. Dried beans and pea. Eggs.  Dairy  Low-fat or fat-free milk, yogurt, and cheeses.  Sweets and Desserts  Frozen desserts made from low-fat milk.  Fats and Oils  Olive, peanut, and canola oils and margarine. Salad dressing and mayonnaise made from these oils.  Other  Soups and casseroles made from allowed ingredients and without added fat or salt.  The items listed above may not be a complete list of recommended foods or beverages. Contact your dietitian for more options.  What foods are not recommended?  Grains  Sweetened, low-fiber cereals. Packaged baked goods. Snack crackers and chips. Cheese crackers, butter crackers, and biscuits. Frozen waffles, sweet breads, doughnuts, pastries, packaged baking mixes, pancakes, cakes, and cookies.  Vegetables  Regular canned or frozen vegetables or vegetables prepared with salt. Canned tomatoes. Canned tomato sauce. Fried vegetables. Vegetables in cream sauce or cheese sauce.  Fruits  Fruits packed in syrup or made with added sugar.  Meats and Other Protein Sources  Marbled or fatty meats such as ribs. Poultry with skin. Fried meats, poultry, eggs, or fish. Sausages, hot dogs, and deli meats such as pastrami, bologna, or salami.  Dairy  Whole milk, cream, cheeses made from whole milk,  sour cream. Ice cream or yogurt made from whole milk or with added sugar.  Beverages  For adults, no more than one alcoholic drink per day. Regular soft drinks or other sugary beverages. Juice drinks.  Sweets and Desserts  Sugary or fatty desserts, candy, and other sweets.  Fats and Oils  Solid shortening or partially hydrogenated oils. Solid margarine. Margarine that contains trans fats. Butter.  The items listed above may not be a complete list of foods and beverages to avoid. Contact your dietitian for more information.  This information is not intended to replace advice given to you by your health care provider. Make sure you discuss any questions you have with your health care provider.  Document Released: 01/06/2009 Document Revised: 05/25/2017 Document Reviewed: 11/26/2014  Elsevier Interactive Patient Education © 2018 [employer]

## 2018-10-25 NOTE — PROGRESS NOTES
Tejal Solitario MA   Patient Intake Note    Review of Systems  Review of Systems   HENT:        See hpi     Respiratory: Negative for cough, choking, shortness of breath and wheezing.    Neurological: Negative for dizziness, light-headedness and headaches.   Hematological: Does not bruise/bleed easily.   Psychiatric/Behavioral: Negative for sleep disturbance.   All other systems reviewed and are negative.      QUALITY MEASURES    Body Mass Index Screening and Follow-Up Plan  Body mass index is 34.07 kg/m².  Patient's Body mass index is 34.07 kg/m². BMI is above normal parameters. Recommendations include: referral to primary care.    Tobacco Use: Screening and Cessation Intervention  Smoking status: Never Smoker                                                              Smokeless tobacco: Never Used                            Tejal Solitario MA  10/25/2018  10:43 AM

## 2018-10-25 NOTE — PROGRESS NOTES
Ajay Ascencio MD     Chief Complaint   Patient presents with   • Follow-up       HPI   Kaylie Oconnor is a  58 y.o. female who is here for follow up. she currently does not have new complaints.  She denies ear pain, ear drainage or infections.  She does have some continued difficulty with decreased hearing.  Her hearing aid that she use on the left ear is older and probably needs to be evaluated.    Review of Systems:  Reviewed per patient intake note    Past History:  Past medical and surgical history, family history and social history reviewed and updated when appropriate.  Current medications and allergies reviewed and updated when appropriate.  Allergies:  Patient has no known allergies.    Vital Signs:   Temp:  [97.9 °F (36.6 °C)] 97.9 °F (36.6 °C)  BP: (134)/(84) 134/84    Physical Exam   CONSTITUTIONAL: well nourished, well-developed, alert, oriented, in no acute distress   COMMUNICATION AND VOICE: able to communicate normally, normal voice quality  HEAD: normocephalic, no lesions, atraumatic, no tenderness, no masses   FACE: appearance normal, no lesions, no tenderness, no deformities, facial motion symmetric  EYES: ocular motility normal, eyelids normal, orbits normal, no proptosis, conjunctiva normal , pupils equal, round  HEARING: response to conversational voice normal bilaterally   EXTERNAL EARS: auricles without lesions  EXTERNAL EAR CANAL: Normal structure bilaterally with mild right-sided cerumen accumulation  TYMPANIC MEMBRANE: On the left, she has stable myringosclerosis of the tympanic membrane with a mild superior retraction without collection of debris.  On the right, there is scarring of the tympanic membrane with a posterior T tube that is surrounded by ceruminous accumulation.  The lumen of the tube appears to be obstructed.  EXTERNAL NOSE: structure normal, no tenderness on palpation, no nasal discharge, no lesions, no evidence of trauma, nostrils patent  LIPS: structure normal, no  tenderness on palpation, no lesions, no evidence of trauma  NECK: neck appearance normal  LYMPH NODES: no lymphadenopathy  CHEST/RESPIRATORY: respiratory effort normal  CARDIOVASCULAR: extremities without cyanosis or edema, no overt jugulovenous distension present  NEUROLOGIC/PSYCHIATRIC: oriented appropriately for age, mood normal, affect appropriate, cranial nerves intact grossly unless specifically mentioned above         Assessment   1. Eustachian tube dysfunction, bilateral    2. Mixed hearing loss, bilateral    3. Ventilation tube blocked, initial encounter        Plan    Since she is currently not having any symptoms related to eustachian tube with a plugged tube, we have elected to observe this.  If she starts having pain or increasing hearing difficulty on the right side, she may need to have revision of this and would require going to the operating room for tube removal, and possible myringotomy tube insertion.  Far as her hearing, I recommended hearing aid evaluation and a new hearing aid as indicated.  They were going to discuss this with a hearing dispensary.    Return in about 6 months (around 4/25/2019).    Ajay Ascencio MD  10/25/18  12:01 PM

## 2019-04-21 NOTE — PROGRESS NOTES
Ajay Ascencio MD     Chief Complaint   Patient presents with   • Ear Problem     6 month follow up        History of Present Illness  Kaylie Oconnor is a  59 y.o. female who is here for follow up. She has had no current complaints. The patient has not had complaints of: ear pain, ear drainage or change in hearing.    Review of Systems  Reviewed per patient intake note    Past History:  Past medical and surgical history, family history and social history reviewed and updated when appropriate.  Current medications and allergies reviewed and updated when appropriate.  Allergies:  Patient has no known allergies.        Vital Signs:   Temp:  [97.8 °F (36.6 °C)] 97.8 °F (36.6 °C)  Heart Rate:  [84] 84  BP: (113)/(69) 113/69    Physical Exam:  CONSTITUTIONAL: well nourished, well-developed, alert, oriented, in no acute distress   COMMUNICATION AND VOICE: able to communicate normally, normal voice quality  HEAD: normocephalic, no lesions, atraumatic, no tenderness, no masses   FACE: appearance normal, no lesions, no tenderness, no deformities, facial motion symmetric  EYES: ocular motility normal, eyelids normal, orbits normal, no proptosis, conjunctiva normal , pupils equal, round  HEARING: response to conversational voice normal bilaterally   EXTERNAL EARS: auricles without lesions  EXTERNAL EAR CANALS: normal ear canals without stenosis or significant cerumen  TYMPANIC MEMBRANE: On the left, she has stable myringosclerosis of the tympanic membrane with a mild superior retraction without collection of debris. On the right, there is scarring of the tympanic membrane with a posterior T tube that is surrounded by ceruminous accumulation. The lumen of the tube appears to be obstructed, stable from the previous examination.  EXTERNAL NOSE: structure normal, no tenderness on palpation, no nasal discharge, no lesions, no evidence of trauma, nostrils patent  LIPS: structure normal, no tenderness on palpation, no lesions, no  evidence of trauma  NECK: neck appearance normal  LYMPH NODES: no lymphadenopathy  CHEST/RESPIRATORY: respiratory effort normal  CARDIOVASCULAR: extremities without cyanosis or edema, no overt jugulovenous distension present  NEUROLOGIC/PSYCHIATRIC: oriented appropriately for age, mood normal, affect appropriate, cranial nerves intact grossly unless specifically mentioned above          Assessment   1. Mixed hearing loss, bilateral    2. Eustachian tube dysfunction, bilateral        Plan    Conservative management.  At some point in time, if she begins to have symptoms on the right-hand side, we may need to consider going to the operating room for tube removal and replacement.  As long as she is without symptoms, I will continue as is.    Return in about 6 months (around 10/22/2019).    Ajay Ascencio MD  04/22/19  9:53 AM

## 2019-04-22 ENCOUNTER — OFFICE VISIT (OUTPATIENT)
Dept: OTOLARYNGOLOGY | Facility: CLINIC | Age: 59
End: 2019-04-22

## 2019-04-22 VITALS
DIASTOLIC BLOOD PRESSURE: 69 MMHG | BODY MASS INDEX: 35.68 KG/M2 | WEIGHT: 170 LBS | HEIGHT: 58 IN | OXYGEN SATURATION: 96 % | SYSTOLIC BLOOD PRESSURE: 113 MMHG | TEMPERATURE: 97.8 F | HEART RATE: 84 BPM

## 2019-04-22 DIAGNOSIS — H69.83 EUSTACHIAN TUBE DYSFUNCTION, BILATERAL: ICD-10-CM

## 2019-04-22 DIAGNOSIS — H90.6 MIXED HEARING LOSS, BILATERAL: Primary | ICD-10-CM

## 2019-04-22 PROCEDURE — 99213 OFFICE O/P EST LOW 20 MIN: CPT | Performed by: OTOLARYNGOLOGY

## 2019-04-22 RX ORDER — PANTOPRAZOLE SODIUM 40 MG/1
40 TABLET, DELAYED RELEASE ORAL DAILY
COMMUNITY
End: 2022-09-19

## 2019-04-22 RX ORDER — HYDROXYZINE HYDROCHLORIDE 10 MG/1
10 TABLET, FILM COATED ORAL 4 TIMES DAILY
COMMUNITY
End: 2019-10-28 | Stop reason: ALTCHOICE

## 2019-04-22 NOTE — PROGRESS NOTES
Marisol William MA   Patient Intake Note    Review of Systems  Review of Systems   Constitutional: Negative for fatigue and fever.   HENT:        See HPI   Eyes: Negative for pain.   Respiratory: Negative for cough and shortness of breath.    Cardiovascular: Negative.  Negative for chest pain and palpitations.   Gastrointestinal: Negative for constipation, diarrhea, nausea and vomiting.   Endocrine: Negative for cold intolerance and heat intolerance.   Genitourinary: Negative for dysuria, flank pain, frequency, hematuria and urgency.   Musculoskeletal: Positive for gait problem. Negative for arthralgias and back pain.   Skin: Negative for pallor, rash and wound.   Allergic/Immunologic: Negative for immunocompromised state.   Neurological: Negative for dizziness, tremors, seizures, weakness, numbness and headaches.   Hematological: Does not bruise/bleed easily.   Psychiatric/Behavioral: Negative for suicidal ideas.   All other systems reviewed and are negative.      QUALITY MEASURES    Body Mass Index Screening and Follow-Up Plan  Body mass index is 35.53 kg/m².  Patient's Body mass index is 35.53 kg/m². BMI is above normal parameters. Recommendations include: referral to primary care.    Tobacco Use: Screening and Cessation Intervention  Social History    Tobacco Use      Smoking status: Never Smoker      Smokeless tobacco: Never Used        Marisol William MA  4/22/2019  9:12 AM

## 2019-10-27 NOTE — PROGRESS NOTES
Ajay Ascencio MD     Chief Complaint   Patient presents with   • Hearing Loss     6 month follow-up.        History of Present Illness  Kaylie Oconnor is a  59 y.o. female who is here for follow up.  She has a history of chronic ear disease.  She has had no current complaints. The patient has not had complaints of: ear pain, ear drainage or change in hearing.      Review of Systems  Reviewed per patient intake note    Past History:  Past medical and surgical history, family history and social history reviewed and updated when appropriate.  Current medications and allergies reviewed and updated when appropriate.  Allergies:  Patient has no known allergies.        Vital Signs:   Temp:  [98.1 °F (36.7 °C)] 98.1 °F (36.7 °C)  Heart Rate:  [78] 78  Resp:  [17] 17  BP: (118)/(88) 118/88    Physical Exam:  CONSTITUTIONAL: well nourished, well-developed, alert, oriented, in no acute distress   COMMUNICATION AND VOICE: able to communicate normally, normal voice quality  HEAD: normocephalic, no lesions, atraumatic, no tenderness, no masses   FACE: appearance normal, no lesions, no tenderness, no deformities, facial motion symmetric  EYES: ocular motility normal, eyelids normal, orbits normal, no proptosis, conjunctiva normal , pupils equal, round  HEARING: response to conversational voice normal bilaterally   EXTERNAL EARS: auricles without lesions  EXTERNAL EAR CANALS: normal ear canals without stenosis or significant cerumen  TYMPANIC MEMBRANE: The left tympanic membrane has postoperative changes and is stable with no evidence of cholesteatoma.  On the right, there is an intact T-tube which is obstructed by cerumen with cerumen base of the tube.  There is no drainage no evidence of granulation tissue  EXTERNAL NOSE: structure normal, no tenderness on palpation, no nasal discharge, no lesions, no evidence of trauma, nostrils patent  LIPS: structure normal, no tenderness on palpation, no lesions, no evidence of  trauma  NECK: neck appearance normal  LYMPH NODES: no lymphadenopathy  CHEST/RESPIRATORY: respiratory effort normal  CARDIOVASCULAR: extremities without cyanosis or edema, no overt jugulovenous distension present  NEUROLOGIC/PSYCHIATRIC: oriented appropriately for age, mood normal, affect appropriate, cranial nerves intact grossly unless specifically mentioned above          Assessment   1. Mixed hearing loss, bilateral    2. Eustachian tube dysfunction, bilateral        Plan    Discussed possibilities of changing out the right-sided tube in the operating room.  However, she has no complaints at this time and we can watch it unless symptoms develop.  Patient Instructions   Call for ear problems, especially change of hearing, ear pain or dizziness.           Return in about 6 months (around 4/28/2020).    Ajay Ascencio MD  10/28/19  9:22 AM

## 2019-10-28 ENCOUNTER — OFFICE VISIT (OUTPATIENT)
Dept: OTOLARYNGOLOGY | Facility: CLINIC | Age: 59
End: 2019-10-28

## 2019-10-28 VITALS
TEMPERATURE: 98.1 F | RESPIRATION RATE: 17 BRPM | BODY MASS INDEX: 36.94 KG/M2 | SYSTOLIC BLOOD PRESSURE: 118 MMHG | DIASTOLIC BLOOD PRESSURE: 88 MMHG | HEART RATE: 78 BPM | OXYGEN SATURATION: 98 % | HEIGHT: 58 IN | WEIGHT: 176 LBS

## 2019-10-28 DIAGNOSIS — H69.83 EUSTACHIAN TUBE DYSFUNCTION, BILATERAL: ICD-10-CM

## 2019-10-28 DIAGNOSIS — H90.6 MIXED HEARING LOSS, BILATERAL: Primary | ICD-10-CM

## 2019-10-28 PROCEDURE — 99213 OFFICE O/P EST LOW 20 MIN: CPT | Performed by: OTOLARYNGOLOGY

## 2019-10-28 RX ORDER — METOPROLOL SUCCINATE 25 MG/1
1 TABLET, EXTENDED RELEASE ORAL DAILY
COMMUNITY

## 2019-10-28 NOTE — PROGRESS NOTES
Venus Nathan MA   Patient Intake Note    Review of Systems  Review of Systems   Constitutional: Negative for chills, fatigue and fever.   HENT:        See HPI   Eyes: Negative for pain, discharge, redness and itching.   Respiratory: Negative for cough, choking and wheezing.    Gastrointestinal: Negative for diarrhea, nausea and vomiting.   Musculoskeletal: Negative for neck pain and neck stiffness.   Allergic/Immunologic: Negative for environmental allergies and food allergies.   Neurological: Negative for dizziness, weakness, light-headedness and headaches.   Psychiatric/Behavioral: Negative for sleep disturbance.   All other systems reviewed and are negative.      QUALITY MEASURES    Tobacco Use: Screening and Cessation Intervention  Social History    Tobacco Use      Smoking status: Never Smoker      Smokeless tobacco: Never Used        Venus Nathan MA  10/28/2019  9:08 AM

## 2020-06-02 NOTE — PROGRESS NOTES
Ajay Ascencio MD     Chief Complaint   Patient presents with   • Ear Tube Follow-up        HPI  Kaylie Oconnor is a  60 y.o. female who is here for follow up.  She has had a history of myringotomy tube insertion in the past.  She is had a history of chronic ear disease and tympanomastoidectomy in the past.  She wears hearing aids on the left side.  She has had difficulty with decreasing hearing mostly that she has noticed on the right side.  She has not had drainage.  She does not have pain or irritation.  The right tube in the past was a T-tube and is been in place for several years.      Review of Systems   Constitutional: Negative for chills and fever.   HENT: Positive for hearing loss. Negative for ear discharge and ear pain.    Respiratory: Negative for cough, choking and shortness of breath.    Gastrointestinal: Negative for diarrhea, nausea and vomiting.   Musculoskeletal: Negative for neck pain and neck stiffness.   Neurological: Negative for dizziness, light-headedness and headaches.   Hematological: Bruises/bleeds easily.   Psychiatric/Behavioral: Negative for sleep disturbance.   All other systems reviewed and are negative.     I have reviewed the ROS as documented by the MA/LPN/RN Ajay Ascencio MD     Past History:   Past medical and surgical history, family history and social history reviewed and updated when appropriate.  Current medications and allergies reviewed and updated when appropriate.  Allergies:  Patient has no known allergies.        Vital Signs:   Temp:  [97.8 °F (36.6 °C)] 97.8 °F (36.6 °C)  Heart Rate:  [85] 85  BP: (128)/(83) 128/83    Physical Exam  CONSTITUTIONAL: well nourished, well-developed, alert, oriented, in no acute distress   COMMUNICATION AND VOICE: able to communicate normally, normal voice quality  HEAD: normocephalic, no lesions, atraumatic, no tenderness, no masses   FACE: appearance normal, no lesions, no tenderness, no deformities, facial motion  symmetric  EYES: ocular motility normal, eyelids normal, orbits normal, no proptosis, conjunctiva normal , pupils equal, round  HEARING: normal with hearing aid use  EXTERNAL EARS: auricles without lesions  EXTERNAL EAR CANAL: She has cerumen that is mostly accumulated around the shaft of the T-tube.  It is nonobstructing.  This is on the right side.  The left ear canal is clear.  TYMPANIC MEMBRANE: There is a T-tube in the right tympanic membrane.  This is with wax surrounding the shaft of the tube but the lumen of the tube is open.  I see no granulation tissue or drainage.  There is a lot of sclerotic change but no evidence of perforation or fluid.  On the left, the eardrum is status post surgery with sclerosis and possible cartilaginous tympanoplasty change.  There is no granulation tissue, perforation, drainage, or fluid.  EXTERNAL NOSE: structure normal, no tenderness on palpation, no nasal discharge, no lesions, no evidence of trauma, nostrils patent  LIPS: structure normal, no tenderness on palpation, no lesions, no evidence of trauma  NECK: neck appearance normal  LYMPH NODES: no lymphadenopathy  CHEST/RESPIRATORY: respiratory effort normal  CARDIOVASCULAR: extremities without cyanosis or edema, no overt jugulovenous distension present  NEUROLOGIC/PSYCHIATRIC: oriented appropriately for age, mood normal, affect appropriate, cranial nerves intact grossly unless specifically mentioned above     RESULTS REVIEW:    I have reviewed the patients old records in the chart.        Assessment/Plan    Assessment:   1. Dysfunction of Eustachian tube, bilateral    2. S/P myringotomy with insertion of tube    3. Mixed conductive and sensorineural hearing loss of both ears        Plan:      I feel that her hearing changes most likely her known mixed hearing loss due to the surgeries and chronic ear disease combined with natural aging changes to her inner ear.  I feel the best way to address this is to have a hearing aid  evaluation to see if it might be time for her to wear hearing aid on the right side.  If she continues to have problems or if the evaluation shows signs that we need to address the longstanding tube in her ear, I can always take her back to the operating room for an examination, cleaning of the tube, and changed out to a fresh tube.  However, with the symptoms that she is describing, I feel that it is more likely that she would gain better benefit from hearing aid evaluation and use.             Return in 1 year (on 6/3/2021).        Ajay Ascencio MD  06/03/20  12:08

## 2020-06-03 ENCOUNTER — OFFICE VISIT (OUTPATIENT)
Dept: OTOLARYNGOLOGY | Facility: CLINIC | Age: 60
End: 2020-06-03

## 2020-06-03 VITALS
HEIGHT: 58 IN | DIASTOLIC BLOOD PRESSURE: 83 MMHG | BODY MASS INDEX: 37.36 KG/M2 | SYSTOLIC BLOOD PRESSURE: 128 MMHG | WEIGHT: 178 LBS | HEART RATE: 85 BPM | TEMPERATURE: 97.8 F

## 2020-06-03 DIAGNOSIS — Z96.22 S/P MYRINGOTOMY WITH INSERTION OF TUBE: ICD-10-CM

## 2020-06-03 DIAGNOSIS — H69.83 DYSFUNCTION OF EUSTACHIAN TUBE, BILATERAL: ICD-10-CM

## 2020-06-03 DIAGNOSIS — H90.6 MIXED CONDUCTIVE AND SENSORINEURAL HEARING LOSS OF BOTH EARS: ICD-10-CM

## 2020-06-03 PROCEDURE — 99213 OFFICE O/P EST LOW 20 MIN: CPT | Performed by: OTOLARYNGOLOGY

## 2020-06-03 RX ORDER — FLUTICASONE PROPIONATE 50 MCG
2 SPRAY, SUSPENSION (ML) NASAL AS NEEDED
COMMUNITY

## 2020-06-03 NOTE — PATIENT INSTRUCTIONS
I feel that her hearing changes most likely her known mixed hearing loss due to the surgeries and chronic ear disease combined with natural aging changes to her inner ear.  I feel the best way to address this is to have a hearing aid evaluation to see if it might be time for her to wear hearing aid on the right side.  If she continues to have problems or if the evaluation shows signs that we need to address the longstanding tube in her ear, I can always take her back to the operating room for an examination, cleaning of the tube, and changed out to a fresh tube.  However, with the symptoms that she is describing, I feel that it is more likely that she would gain better benefit from hearing aid evaluation and use.

## 2021-06-03 ENCOUNTER — OFFICE VISIT (OUTPATIENT)
Dept: OTOLARYNGOLOGY | Facility: CLINIC | Age: 61
End: 2021-06-03

## 2021-06-03 VITALS
HEART RATE: 81 BPM | TEMPERATURE: 97.5 F | WEIGHT: 170 LBS | SYSTOLIC BLOOD PRESSURE: 124 MMHG | DIASTOLIC BLOOD PRESSURE: 75 MMHG | BODY MASS INDEX: 35.53 KG/M2

## 2021-06-03 DIAGNOSIS — H90.6 MIXED CONDUCTIVE AND SENSORINEURAL HEARING LOSS OF BOTH EARS: ICD-10-CM

## 2021-06-03 DIAGNOSIS — Z96.22 S/P MYRINGOTOMY WITH INSERTION OF TUBE: ICD-10-CM

## 2021-06-03 DIAGNOSIS — H69.83 EUSTACHIAN TUBE DYSFUNCTION, BILATERAL: Primary | ICD-10-CM

## 2021-06-03 PROCEDURE — 99214 OFFICE O/P EST MOD 30 MIN: CPT | Performed by: EMERGENCY MEDICINE

## 2021-06-03 RX ORDER — CIPROFLOXACIN AND DEXAMETHASONE 3; 1 MG/ML; MG/ML
3 SUSPENSION/ DROPS AURICULAR (OTIC) 4 TIMES DAILY
Qty: 7.5 ML | Refills: 1 | Status: SHIPPED | OUTPATIENT
Start: 2021-06-03 | End: 2022-09-19

## 2021-06-03 NOTE — PATIENT INSTRUCTIONS
CONTACT INFORMATION:  The main office phone number is [phone]. For emergencies after hours and on weekends, this number will convert over to our answering service and the on call provider will answer. Please try to keep non emergent phone calls/ questions to office hours 9am-5pm Monday through Friday.     Splurgy  As an alternative, you can sign up and use the Epic MyChart system for more direct and quicker access for non emergent questions/ problems.  [facility] Splurgy allows you to send messages to your doctor, view your test results, renew your prescriptions, schedule appointments, and more. To sign up, go to [employer] and click on the Sign Up Now link in the New User? box. Enter your Splurgy Activation Code exactly as it appears below along with the last four digits of your Social Security Number and your Date of Birth () to complete the sign-up process. If you do not sign up before the expiration date, you must request a new code.    Splurgy Activation Code: Activation code not generated  Current Splurgy Status: Account disabled    If you have questions, you can email [email or web address] or call [phone] to talk to our Splurgy staff. Remember, Splurgy is NOT to be used for urgent needs. For medical emergencies, dial 911.

## 2022-03-10 ENCOUNTER — OFFICE VISIT (OUTPATIENT)
Dept: OTOLARYNGOLOGY | Facility: CLINIC | Age: 62
End: 2022-03-10

## 2022-03-10 VITALS
WEIGHT: 159.4 LBS | TEMPERATURE: 97.8 F | DIASTOLIC BLOOD PRESSURE: 77 MMHG | HEIGHT: 61 IN | HEART RATE: 92 BPM | SYSTOLIC BLOOD PRESSURE: 122 MMHG | BODY MASS INDEX: 30.09 KG/M2

## 2022-03-10 DIAGNOSIS — H90.6 MIXED CONDUCTIVE AND SENSORINEURAL HEARING LOSS OF BOTH EARS: ICD-10-CM

## 2022-03-10 DIAGNOSIS — H69.83 EUSTACHIAN TUBE DYSFUNCTION, BILATERAL: Primary | ICD-10-CM

## 2022-03-10 DIAGNOSIS — Z96.22 S/P MYRINGOTOMY WITH INSERTION OF TUBE: ICD-10-CM

## 2022-03-10 PROCEDURE — 99213 OFFICE O/P EST LOW 20 MIN: CPT | Performed by: OTOLARYNGOLOGY

## 2022-03-10 RX ORDER — OXYBUTYNIN CHLORIDE 5 MG/1
5 TABLET ORAL 2 TIMES DAILY
COMMUNITY

## 2022-03-10 RX ORDER — PHENOL 1.4 %
600 AEROSOL, SPRAY (ML) MUCOUS MEMBRANE DAILY
COMMUNITY

## 2022-03-10 RX ORDER — ATORVASTATIN CALCIUM 10 MG/1
10 TABLET, FILM COATED ORAL DAILY
COMMUNITY
Start: 2022-02-07

## 2022-03-10 RX ORDER — DOCUSATE SODIUM 100 MG/1
100 CAPSULE, LIQUID FILLED ORAL 2 TIMES DAILY
COMMUNITY

## 2022-03-10 NOTE — PROGRESS NOTES
Ajay Ascencio MD  [facility] [place] [facility] EAR NOSE & THROAT  [address], [address]  [facility] [address]  Fax [phone]  Phone [phone]      Visit Type: FOLLOW UP   Chief Complaint   Patient presents with   • Follow-up   • Ear Problem        ARTURO Oconnor is a  62 y.o. female who is here for follow up. She has had no current complaints. The patient has not had complaints of: ear pain, ear drainage or change in hearing.     Past Medical History:   Diagnosis Date   • Allergic rhinitis    • Cellulitis    • Chronic otitis media    • Eustachian tube dysfunction    • GERD (gastroesophageal reflux disease)    • Hearing loss    • Hyperlipidemia    • Hypertension    • Tympanic membrane perforation        Past Surgical History:   Procedure Laterality Date   • CLEFT PALATE REPAIR     • HIP SURGERY Left    • MYRINGOTOMY W/ TUBES     • TYMPANOPLASTY         Family History: Her family history includes Diabetes in her mother; Heart disease in her father and mother; Hypertension in her father and mother.     Social History: She  reports that she has never smoked. She has never used smokeless tobacco. She reports that she does not drink alcohol and does not use drugs.    Home Medications:  atorvastatin, calcium carbonate, ciprofloxacin-dexamethasone, diphenhydrAMINE, docusate sodium, fluticasone, metoprolol succinate XL, oxybutynin, pantoprazole, and triamterene-hydrochlorothiazide    Allergies:  She has No Known Allergies.       Vital Signs:   Temp:  [97.8 °F (36.6 °C)] 97.8 °F (36.6 °C)  Heart Rate:  [92] 92  BP: (122)/(77) 122/77  Physical Exam  CONSTITUTIONAL: well nourished, well-developed, alert, oriented, in no acute distress,    COMMUNICATION AND VOICE: able to communicate normally for age, mild hyponasal voice quality  HEAD: normocephalic, no lesions, atraumatic, no tenderness, no masses   FACE: appearance normal, no lesions, no tenderness, no deformities,  facial motion symmetric  EYES: ocular motility normal, eyelids normal, orbits normal, no proptosis, conjunctiva normal , pupils equal, round   EARS:  HEARING: response to conversational voice normal bilaterally   EXTERNAL EAR: auricles without lesions  EXTERNAL AUDITORY CANAL: ear canals normal, no obstruction  RIGHT TYMPANIC MEMBRANE: myringotomy tube findings: T tube visible with cerumen surrounding and partially obstructing.  No drainage.  LEFT TYMPANIC MEMBRANE: sclerotic without perforation  EXTERNAL NOSE: structure normal, no tenderness on palpation, no nasal discharge, no lesions, no evidence of trauma, nostrils patent       Result Review    RESULTS REVIEW    I have reviewed the patients old records in the chart.     Assessment/Plan    Diagnoses and all orders for this visit:    1. Eustachian tube dysfunction, bilateral (Primary)    2. S/P myringotomy with insertion of tube    3. Mixed conductive and sensorineural hearing loss of both ears       Continue current management plan.           Return in about 6 months (around 9/10/2022).      Ajay Ascencio MD  03/10/22  16:55 CST

## 2022-09-19 ENCOUNTER — OFFICE VISIT (OUTPATIENT)
Dept: OTOLARYNGOLOGY | Facility: CLINIC | Age: 62
End: 2022-09-19

## 2022-09-19 VITALS
SYSTOLIC BLOOD PRESSURE: 130 MMHG | TEMPERATURE: 97.6 F | BODY MASS INDEX: 28.58 KG/M2 | DIASTOLIC BLOOD PRESSURE: 70 MMHG | HEIGHT: 61 IN | HEART RATE: 87 BPM | WEIGHT: 151.4 LBS

## 2022-09-19 DIAGNOSIS — H69.83 EUSTACHIAN TUBE DYSFUNCTION, BILATERAL: Primary | ICD-10-CM

## 2022-09-19 DIAGNOSIS — H61.21 EXCESSIVE CERUMEN IN EAR CANAL, RIGHT: ICD-10-CM

## 2022-09-19 DIAGNOSIS — H90.6 MIXED CONDUCTIVE AND SENSORINEURAL HEARING LOSS OF BOTH EARS: ICD-10-CM

## 2022-09-19 PROBLEM — H69.93 EUSTACHIAN TUBE DYSFUNCTION, BILATERAL: Status: ACTIVE | Noted: 2022-09-19

## 2022-09-19 PROCEDURE — 99213 OFFICE O/P EST LOW 20 MIN: CPT | Performed by: EMERGENCY MEDICINE

## 2022-09-19 PROCEDURE — 69210 REMOVE IMPACTED EAR WAX UNI: CPT | Performed by: OTOLARYNGOLOGY

## 2022-09-19 RX ORDER — KETOCONAZOLE 20 MG/ML
SHAMPOO TOPICAL
COMMUNITY
Start: 2022-08-02

## 2022-09-19 RX ORDER — CONJUGATED ESTROGENS 0.62 MG/G
CREAM VAGINAL
COMMUNITY
Start: 2022-09-16

## 2022-09-19 NOTE — PROGRESS NOTES
JESSICA Ryan ENT [facility] EAR NOSE & THROAT  [address], [address]  [facility] [facility] [address]  Fax [phone]  Phone [phone]      Visit Type: FOLLOW UP   Chief Complaint   Patient presents with   • Follow-up   • Ear Problem        ARTURO Oconnor is a  62 y.o. female who is here for follow up. She has had no current complaints. The patient has not had complaints of: otalgia, ear pressure and otorrhea.     Past Medical History:   Diagnosis Date   • Allergic rhinitis    • Cellulitis    • Chronic otitis media    • Eustachian tube dysfunction    • GERD (gastroesophageal reflux disease)    • Hearing loss    • Hyperlipidemia    • Hypertension    • Tympanic membrane perforation        Past Surgical History:   Procedure Laterality Date   • CLEFT PALATE REPAIR     • HIP SURGERY Left    • MYRINGOTOMY W/ TUBES     • TYMPANOPLASTY         Family History: Her family history includes Diabetes in her mother; Heart disease in her father and mother; Hypertension in her father and mother.     Social History: She  reports that she has never smoked. She has never used smokeless tobacco. She reports that she does not drink alcohol and does not use drugs.    Home Medications:  atorvastatin, calcium carbonate, conjugated estrogens, docusate sodium, fluticasone, ketoconazole, metoprolol succinate XL, oxybutynin, triamcinolone, and triamterene-hydrochlorothiazide    Allergies:  She is allergic to nsaids.       Vital Signs:   Temp:  [97.6 °F (36.4 °C)] 97.6 °F (36.4 °C)  Heart Rate:  [87] 87  BP: (130)/(70) 130/70  ENT Physical Exam  Constitutional  Appearance: patient appears well-developed, well-nourished and well-groomed,  Communication/Voice: communication appropriate for developmental age; vocal quality normal;  Head and Face  Appearance: head appears normal, face appears normal and face appears atraumatic;  Palpation: facial palpation normal;  Salivary: glands  normal;  Ear  Hearing: intact;  Auricles: right auricle normal; left auricle normal;  External Mastoids: right external mastoid normal; left external mastoid normal;  Ear Canals: right ear canal obstruction observed; left ear canal normal; Ear Canal comments: cerumen obstructing view of tube, cerumen removed  Tympanic Membranes: right tympanic membrane tympanostomy tube noted; T-type tube; left tympanic membrane abnormal and perforated;     Cerumen Removal    Date/Time: 9/19/2022 1:12 PM  Performed by: Ajay Ascencio MD  Authorized by: Ajay Ascencio MD   Consent: Verbal consent obtained.  Consent given by: patient  Patient identity confirmed: verbally with patient    Anesthesia:  Local Anesthetic: none  Location details: right ear  Patient tolerance: patient tolerated the procedure well with no immediate complications  Procedure type: instrumentation, curette   Sedation:  Patient sedated: no           Result Review    RESULTS REVIEW    I have reviewed the patients old records in the chart.     Assessment & Plan    Diagnoses and all orders for this visit:    1. Eustachian tube dysfunction, bilateral (Primary)    2. Mixed conductive and sensorineural hearing loss of both ears    3. Excessive cerumen in ear canal, right  -     Cerumen Removal            Protect getting water in the ears. If needed, may use over the counter silicone plugs or a cotton ball coated with vasoline when bathing.  Use hairdryer on a cool setting after bathing.  For proper use of ear drops, push on tragus (cartilage in front of ear canal) after drop placement.      Return in about 6 months (around 3/19/2023) for for tube follow up.      JESSICA Ryan  09/19/22  13:13 CDT     Physician Attestation  I have seen and examined Kaylie Oconnor and have reviewed the notes, assessments, and/or procedures and I concur with this documentation.      She has done well with no overt complaints.  I removed cerumen from the right external  auditory canal.  She still has a tube on that side which appears to be in place but could have some crusting in the lumen.  The eardrum looks intact.  On the left side she is status post cartilaginous tympanoplasty which is well-healed.    We will continue to observe her ears.  If she has symptoms before the next 6 months she can call for a sooner appointment but otherwise we will see her in 6 months.  Electronically signed by Ajay Ascencio MD, 09/19/22, 4:07 PM CDT.

## 2023-03-23 ENCOUNTER — OFFICE VISIT (OUTPATIENT)
Dept: OTOLARYNGOLOGY | Facility: CLINIC | Age: 63
End: 2023-03-23
Payer: MEDICARE

## 2023-03-23 VITALS — HEIGHT: 61 IN | TEMPERATURE: 97.5 F | BODY MASS INDEX: 27.38 KG/M2 | WEIGHT: 145 LBS

## 2023-03-23 DIAGNOSIS — H69.83 EUSTACHIAN TUBE DYSFUNCTION, BILATERAL: Primary | ICD-10-CM

## 2023-03-23 DIAGNOSIS — Z96.22 S/P MYRINGOTOMY WITH INSERTION OF TUBE: ICD-10-CM

## 2023-03-23 DIAGNOSIS — L98.9 LESION OF SKIN OF CHEEK: ICD-10-CM

## 2023-03-23 PROCEDURE — 99213 OFFICE O/P EST LOW 20 MIN: CPT | Performed by: EMERGENCY MEDICINE

## 2023-03-23 PROCEDURE — 1159F MED LIST DOCD IN RCRD: CPT | Performed by: EMERGENCY MEDICINE

## 2023-03-23 PROCEDURE — 1160F RVW MEDS BY RX/DR IN RCRD: CPT | Performed by: EMERGENCY MEDICINE

## 2023-03-23 NOTE — PROGRESS NOTES
JESSICA Ryan  [facility] EAR NOSE & THROAT  [address], [address]  [place] [address]  Fax [phone]  Phone [phone]      Visit Type: FOLLOW UP   Chief Complaint   Patient presents with   • Ear Tube Follow-up        HPI  Kaylie Oconnor is a 63 y.o. female who presents status post myringotomy tube insertion. The patient has had: no related complaints. The patient denies pain, fever, change of hearing and otorrhea.    Past Medical History:   Diagnosis Date   • Allergic rhinitis    • Cellulitis    • Chronic otitis media    • Eustachian tube dysfunction    • GERD (gastroesophageal reflux disease)    • Hearing loss    • Hyperlipidemia    • Hypertension    • Tympanic membrane perforation        Past Surgical History:   Procedure Laterality Date   • CLEFT PALATE REPAIR     • HIP SURGERY Left    • MYRINGOTOMY W/ TUBES     • TYMPANOPLASTY         Family History: Her family history includes Diabetes in her mother; Heart disease in her father and mother; Hypertension in her father and mother.     Social History: She  reports that she has never smoked. She has never used smokeless tobacco. She reports that she does not drink alcohol and does not use drugs.    Home Medications:  Estrogens Conjugated, atorvastatin, calcium carbonate, docusate sodium, fluticasone, ketoconazole, metoprolol succinate XL, oxybutynin, triamcinolone, and triamterene-hydrochlorothiazide    Allergies:  She is allergic to nsaids.       Vital Signs:   Temp:  [97.5 °F (36.4 °C)] 97.5 °F (36.4 °C)  ENT Physical Exam  Constitutional  Appearance: patient appears well-developed, well-nourished and well-groomed,  Communication/Voice: communication appropriate for developmental age; vocal quality normal;  Head and Face  Appearance: head appears normal, face appears normal and face appears atraumatic;  Palpation: facial palpation normal;  Salivary: glands normal;  Ear  Hearing:  intact;  Auricles: right auricle normal; left auricle normal;  External Mastoids: right external mastoid normal; left external mastoid normal;  Ear Canals: right ear canal normal; left ear canal normal;  Tympanic Membranes: left tympanic membrane normal;  Ear comments: Right tube dry and patent  Drawings           CLINICAL PHOTOGRAPHS OTHER - Skin lesion left (03/23/2023)      Result Review    RESULTS REVIEW    I have reviewed the patients old records in the chart.     Assessment & Plan    Diagnoses and all orders for this visit:    1. Eustachian tube dysfunction, bilateral (Primary)    2. S/P myringotomy with insertion of tube    3. Lesion of skin of cheek       Tube follow up in 6 months.  Offered appointment for biopsy of skin lesion.  Care giver would like to wait until next appointment to monitor.    Return in about 6 months (around 9/23/2023) for Follow up with me when Dr. Ascencio in clinic.      Marisol Donaldson, APRN   03/23/23  10:18 CDT

## 2023-09-25 ENCOUNTER — OFFICE VISIT (OUTPATIENT)
Dept: OTOLARYNGOLOGY | Facility: CLINIC | Age: 63
End: 2023-09-25

## 2023-09-25 VITALS
TEMPERATURE: 97.7 F | DIASTOLIC BLOOD PRESSURE: 89 MMHG | HEIGHT: 61 IN | RESPIRATION RATE: 16 BRPM | WEIGHT: 137 LBS | SYSTOLIC BLOOD PRESSURE: 153 MMHG | BODY MASS INDEX: 25.86 KG/M2 | HEART RATE: 65 BPM

## 2023-09-25 DIAGNOSIS — L98.9 SKIN LESION OF FACE: Primary | ICD-10-CM

## 2023-09-25 DIAGNOSIS — H69.93 EUSTACHIAN TUBE DYSFUNCTION, BILATERAL: ICD-10-CM

## 2023-09-25 PROCEDURE — 88305 TISSUE EXAM BY PATHOLOGIST: CPT | Performed by: EMERGENCY MEDICINE

## 2023-09-25 NOTE — PROGRESS NOTES
Ajay Ascencio MD  [facility] [place] EAR NOSE & THROAT  [address]  [place] [address]  Fax [phone]  Phone [phone]      Visit Type: FOLLOW UP   Chief Complaint   Patient presents with    Ear Tube Follow-up    Skin Lesion        HPI  The patient  is a 63 y.o. female who is referred for evaluation of a skin lesion. She has had a raised scaly lesion under the left and a pigmented area of the left mastoid region.    Past Medical History:   Diagnosis Date    Allergic rhinitis     Cellulitis     Chronic otitis media     Eustachian tube dysfunction     GERD (gastroesophageal reflux disease)     Hearing loss     Hyperlipidemia     Hypertension     Tympanic membrane perforation        Past Surgical History:   Procedure Laterality Date    CLEFT PALATE REPAIR      HIP SURGERY Left     MYRINGOTOMY W/ TUBES      TYMPANOPLASTY         Family History: Her family history includes Diabetes in her mother; Heart disease in her father and mother; Hypertension in her father and mother.     Social History: She  reports that she has never smoked. She has never used smokeless tobacco. She reports that she does not drink alcohol and does not use drugs.    Home Medications:  Estrogens Conjugated, atorvastatin, calcium carbonate, docusate sodium, fluticasone, ketoconazole, metoprolol succinate XL, oxybutynin, triamcinolone, and triamterene-hydrochlorothiazide    Allergies:  She is allergic to nsaids.       Vital Signs:   Temp:  [97.7 °F (36.5 °C)] 97.7 °F (36.5 °C)  Heart Rate:  [65] 65  Resp:  [16] 16  BP: (153)/(89) 153/89  ENT Physical Exam  Constitutional  Appearance: patient appears well-developed and well-nourished,  Communication/Voice: communication appropriate for developmental age; vocal quality normal;  Head and Face  Appearance: head appears normal, face appears normal and face appears atraumatic;  Palpation: facial palpation normal;  Salivary:  glands normal;  Head and Face comments: There is a left infraorbital 4 mm raised actinic lesion  Ear  Hearing: intact;  Auricles: right auricle normal; left auricle normal;  External Mastoids: right external mastoid normal; left external mastoid normal; External mastoid comments: Left 3 mm pigmented lesion with rounded borders   Ear Canals: bilateral ear canals normal;  Tympanic Membranes: right tympanic membrane tympanostomy tube noted; plugged and T-type tube; left tympanic membrane abnormal; with myringosclerosis and cartilage graft;  Nose  External Nose: nares patent bilaterally; external nose normal;  Oral Cavity/Oropharynx  Lips: normal;  Neck  Neck: neck normal;  Respiratory  Inspection: breathing unlabored; normal breathing rate;  Cardiovascular  Inspection: extremities are warm and well perfused; no peripheral edema present;  Neurovestibular  Mental Status: alert and oriented;  Psychiatric: mood normal; affect is appropriate;     Skin Biopsy    Date/Time: 9/25/2023 11:03 AM  Performed by: Ajay Ascencio MD  Authorized by: Ajay Ascencio MD       Consent was given by the patient. Informed consent was obtained through written consent consent. The risks of the procedure were discussed including but not limited to bleeding, infection, pain and scarring. Alternatives including no treatment were discussed.     Procedure:  A punch biopsy was performed. The total number of sites biopsied: 2.   Lesion 1 location:  Left cheek  Lesion 2 location:  Left ear  Lidocaine 1% with epinephrine was injected for anesthesia. A 5 mm punch biopsy was used. Hemostasis was obtained with pressure. The site was closed with 5-0 fast-absorbing gut. The specimen was sent for pathology. After the procedure, the site was to have good hemostasis. The wound was treated with antibiotic ointment. The procedure was tolerated well with no immediate complications.    Result Review    RESULTS REVIEW    I have reviewed the patients  old records in the chart.     Assessment & Plan    Diagnoses and all orders for this visit:    1. Skin lesion of face (Primary)  -     $ Skin Biopsy    2. Eustachian tube dysfunction, bilateral         We will notify her to address the lesions if they turn out to be malignancies.  Otherwise we will follow her back up in a months  Return in about 6 months (around 3/25/2024).      Ajay Ascencio MD   09/25/23  11:06 CDT

## 2023-09-27 ENCOUNTER — TELEPHONE (OUTPATIENT)
Dept: OTOLARYNGOLOGY | Facility: CLINIC | Age: 63
End: 2023-09-27
Payer: MEDICARE

## 2023-09-27 LAB
CYTO UR: NORMAL
CYTO UR: NORMAL
LAB AP CASE REPORT: NORMAL
LAB AP CASE REPORT: NORMAL
LAB AP CLINICAL INFORMATION: NORMAL
LAB AP CLINICAL INFORMATION: NORMAL
Lab: NORMAL
Lab: NORMAL
PATH REPORT.FINAL DX SPEC: NORMAL
PATH REPORT.FINAL DX SPEC: NORMAL
PATH REPORT.GROSS SPEC: NORMAL
PATH REPORT.GROSS SPEC: NORMAL

## 2023-09-27 NOTE — TELEPHONE ENCOUNTER
Called patient, reached her sister who is her healthcare surrogate.  Informed of benign biopsy results.

## 2024-03-27 ENCOUNTER — OFFICE VISIT (OUTPATIENT)
Dept: OTOLARYNGOLOGY | Facility: CLINIC | Age: 64
End: 2024-03-27
Payer: MEDICARE

## 2024-03-27 VITALS — TEMPERATURE: 97.2 F | BODY MASS INDEX: 25.13 KG/M2 | WEIGHT: 133 LBS

## 2024-03-27 DIAGNOSIS — H90.6 MIXED CONDUCTIVE AND SENSORINEURAL HEARING LOSS OF BOTH EARS: ICD-10-CM

## 2024-03-27 DIAGNOSIS — H69.93 EUSTACHIAN TUBE DYSFUNCTION, BILATERAL: Primary | ICD-10-CM

## 2024-03-27 NOTE — PROGRESS NOTES
Ajay Ascencio MD  [facility] [place] [facility] EAR NOSE & THROAT  [address]  [facility] [address]  Fax [phone]  Phone [phone]      Visit Type: FOLLOW UP   Chief Complaint   Patient presents with    Follow-up     Skin lesion           History of Present Illness  The patient returns for follow-up. She has a history of eustachian tube dysfunction with tube placements as well as tympanoplasty.  She denies complaints.         History     Last Reviewed by Ajay Ascencio MD on 3/27/2024 at 10:41 AM    Sections Reviewed    Tobacco, Family, Medical, Surgical    Problem list reviewed by Ajay Ascencio MD on 3/27/2024 at 10:41 AM  Medicines reviewed by Ajay Ascencio MD on 3/27/2024 at 10:41 AM  Allergies reviewed by Ajay Ascencio MD on 3/27/2024 at 10:41 AM          Vital Signs:   Temp:  [97.2 °F (36.2 °C)] 97.2 °F (36.2 °C)  Physical Exam       ENT Physical Exam  Constitutional  Appearance: patient appears well-developed and well-nourished,  Communication/Voice: communication appropriate for developmental age; vocal quality normal;  Head and Face  Appearance: head appears normal, face appears normal and face appears atraumatic;  Palpation: facial palpation normal;  Salivary: glands normal;  Ear  Hearing: intact;  Auricles: right auricle normal; left auricle normal;  External Mastoids: right external mastoid normal; left external mastoid normal;  Ear Canals: bilateral ear canals normal;  Tympanic Membranes: right tympanic membrane abnormal; thickened; with myringosclerosis; tympanostomy tube noted; plugged tube; left tympanic membrane abnormal; thickened; with myringosclerosis;  Nose  External Nose: nares patent bilaterally; external nose normal;  Oral Cavity/Oropharynx  Lips: normal;  Neck  Neck: neck normal;  Respiratory  Inspection: breathing unlabored; normal breathing rate;  Cardiovascular  Inspection: extremities are  warm and well perfused; no peripheral edema present;  Neurovestibular  Mental Status: alert and oriented;  Psychiatric: mood normal; affect is appropriate;           Result Review       RESULTS REVIEW    Results             Assessment & Plan    Assessment & Plan  Eustachian tube dysfunction, bilateral    Mixed conductive and sensorineural hearing loss of both ears    History of seborrheic keratoses    Continue current management.           No follow-ups on file.        Patient or patient representative verbalized consent for the use of Ambient Listening during the visit with  Ajay Ascencio MD for chart documentation. 3/27/2024  10:41 CDT  Ajay Ascencio MD

## 2024-09-25 ENCOUNTER — OFFICE VISIT (OUTPATIENT)
Dept: OTOLARYNGOLOGY | Facility: CLINIC | Age: 64
End: 2024-09-25
Payer: MEDICARE

## 2024-09-25 VITALS — WEIGHT: 133 LBS | HEIGHT: 61 IN | BODY MASS INDEX: 25.11 KG/M2 | TEMPERATURE: 97.1 F

## 2024-09-25 DIAGNOSIS — H69.93 EUSTACHIAN TUBE DYSFUNCTION, BILATERAL: Primary | ICD-10-CM

## 2024-09-25 DIAGNOSIS — H90.6 MIXED CONDUCTIVE AND SENSORINEURAL HEARING LOSS OF BOTH EARS: ICD-10-CM

## 2025-05-14 ENCOUNTER — OFFICE VISIT (OUTPATIENT)
Dept: OTOLARYNGOLOGY | Facility: CLINIC | Age: 65
End: 2025-05-14
Payer: MEDICARE

## 2025-05-14 VITALS
HEART RATE: 76 BPM | OXYGEN SATURATION: 98 % | TEMPERATURE: 97.5 F | HEIGHT: 61 IN | SYSTOLIC BLOOD PRESSURE: 128 MMHG | DIASTOLIC BLOOD PRESSURE: 87 MMHG | BODY MASS INDEX: 21.11 KG/M2 | WEIGHT: 111.8 LBS

## 2025-05-14 DIAGNOSIS — H90.6 MIXED CONDUCTIVE AND SENSORINEURAL HEARING LOSS OF BOTH EARS: ICD-10-CM

## 2025-05-14 DIAGNOSIS — H69.93 EUSTACHIAN TUBE DYSFUNCTION, BILATERAL: Primary | ICD-10-CM

## 2025-05-14 RX ORDER — LORATADINE 10 MG/1
10 TABLET ORAL DAILY
COMMUNITY

## 2025-05-14 RX ORDER — FLUOCINOLONE ACETONIDE 0.1 MG/G
CREAM TOPICAL
COMMUNITY

## 2025-05-14 RX ORDER — ACETAMINOPHEN 325 MG/1
TABLET ORAL
COMMUNITY

## 2025-05-14 NOTE — PROGRESS NOTES
Ajay Ascencio MD  [facility] [place] [facility] EAR NOSE & THROAT  [address]  [facility] [place] [facility] [address]  Fax [phone]  Phone [phone]      Visit Type: FOLLOW UP   Chief Complaint   Patient presents with    Ear Tube Follow-up     Follow up on right ear. No pain, pressure or drainage reported. Audiology report from 4/09/2025 in chart. Pt's  reports she doesn't seem to be hearing very well as she constantly interrupts when someone is speaking as if she didn't hear them and repeats information that someone else in the room already said            History of Present Illness  The patient presents for evaluation of auditory impairment, accompanied by her sister.    The patient's sister reports that the patient does not experience otalgia or perceive any muffling of sounds. However, she has been observed to interrupt conversations without realizing their occurrence and occasionally fails to respond appropriately. An audiometric evaluation was conducted on 04/07/2025, and the results were forwarded to this office.    The patient has been utilizing hearing aids for approximately five years, with the right ear demonstrating superior auditory function compared to the left. The hearing aids were recently evaluated and potentially adjusted, although no discernible improvement in auditory perception was noted. The right ear requires a volume of 55 dB for optimal hearing, while the left ear requires 90 dB, with a word recognition score of only 36% compared to 92% on the right. The right ear has a tympanostomy tube that appears partially obstructed with cerumen, but it is still providing ventilation. The left tympanic membrane appears normal, but the inner ear seems to be affected, impacting sound clarity.         History     Last Reviewed by Ajay Ascencio MD on 5/14/2025 at  1:56 PM    Sections Reviewed    Tobacco, Family, Medical, Surgical     Problem list reviewed by Ajay Ascencio MD on 5/14/2025 at  1:56 PM  Medicines reviewed by Ajay Ascencio MD on 5/14/2025 at  1:56 PM  Allergies reviewed by Ajay Ascencio MD on 5/14/2025 at  1:56 PM          Vital Signs:   Temp:  [97.5 °F (36.4 °C)] 97.5 °F (36.4 °C)  Heart Rate:  [76] 76  BP: (128)/(87) 128/87  ENT Physical Exam   Physical Exam  Appearance: patient appears well-developed and well-nourished  Communication/Voice: communication appropriate for developmental age; vocal quality normal  Face: head appears normal, face appears normal and face appears atraumatic  Eyes: normal periorbita  Hearing: intact  Auricles: right auricle normal; left auricle normal  Ear Canals: right ear canal has a T-tube that is open and ventilating, but slightly plugged with cerumen  Tympanic Membrane: right eardrum is distorted with cartilaginous grafting and scarring; left eardrum appears normal  External Nose: nares patent bilaterally; external nose normal  Lips: normal  Neck: normal  Respiratory: breathing unlabored; normal breathing rate  Cardiovascular: extremities are warm and well perfused; no peripheral edema present  Mental Status: alert and oriented  Psychiatric: mood normal; affect is appropriate  Skin: no skin lesions or rashes           Result Review       RESULTS REVIEW    Results  - Diagnostic Testing:    - Audiogram (04/07/2025):      - Mixed hearing loss on both sides      - Right side: around 75 dB rising up to 50 dB and back down to 80 dB on pure tones      - Left side: significantly worse with some sound      - Reception threshold: right at 55 dB, left at 90 dB      - Word recognition score: left side 36%, right side 92%             Assessment & Plan  Eustachian tube dysfunction, bilateral         Mixed conductive and sensorineural hearing loss of both ears              1. Hearing loss.  The audiogram from 04/07/2025 indicates a mixed hearing loss in both ears, with the right ear showing  better hearing than the left.  Optimize the right ear's hearing aid and consider upgrading it. If new technology offers significant benefits for the left ear, it should be considered, but ensure a trial period and money-back guarantee are in place.  Nonimplantable bone-anchored hearing aids were discussed as a potential option if needed.                 Patient or patient representative verbalized consent for the use of Ambient Listening during the visit with  Ajay Ascencio MD for chart documentation. 5/14/2025  13:56 CDT  Ajay Ascencio MD